# Patient Record
Sex: MALE | Race: WHITE | NOT HISPANIC OR LATINO | Employment: FULL TIME | URBAN - METROPOLITAN AREA
[De-identification: names, ages, dates, MRNs, and addresses within clinical notes are randomized per-mention and may not be internally consistent; named-entity substitution may affect disease eponyms.]

---

## 2017-03-21 ENCOUNTER — RECORDS - HEALTHEAST (OUTPATIENT)
Dept: LAB | Facility: CLINIC | Age: 52
End: 2017-03-21

## 2017-03-21 LAB
CHOLEST SERPL-MCNC: 196 MG/DL
FASTING STATUS PATIENT QL REPORTED: YES
HDLC SERPL-MCNC: 62 MG/DL
LDLC SERPL CALC-MCNC: 123 MG/DL
PSA SERPL-MCNC: 1 NG/ML (ref 0–3.5)
TRIGL SERPL-MCNC: 53 MG/DL

## 2018-09-10 ENCOUNTER — OFFICE VISIT (OUTPATIENT)
Dept: UROLOGY | Facility: CLINIC | Age: 53
End: 2018-09-10
Payer: COMMERCIAL

## 2018-09-10 VITALS
HEART RATE: 52 BPM | DIASTOLIC BLOOD PRESSURE: 84 MMHG | SYSTOLIC BLOOD PRESSURE: 142 MMHG | WEIGHT: 180 LBS | HEIGHT: 68 IN | BODY MASS INDEX: 27.28 KG/M2

## 2018-09-10 DIAGNOSIS — R39.198 SLOWING OF URINARY STREAM: Primary | ICD-10-CM

## 2018-09-10 RX ORDER — ALFUZOSIN HYDROCHLORIDE 10 MG/1
10 TABLET, EXTENDED RELEASE ORAL DAILY
Qty: 30 TABLET | Refills: 11 | Status: SHIPPED | OUTPATIENT
Start: 2018-09-10 | End: 2022-05-18

## 2018-09-10 ASSESSMENT — ENCOUNTER SYMPTOMS
STIFFNESS: 1
NECK PAIN: 1
BACK PAIN: 1
ARTHRALGIAS: 1
DIFFICULTY URINATING: 1

## 2018-09-10 ASSESSMENT — PAIN SCALES - GENERAL: PAINLEVEL: NO PAIN (0)

## 2018-09-10 NOTE — MR AVS SNAPSHOT
After Visit Summary   9/10/2018    Erik Stark    MRN: [id number]           Patient Information     Date Of Birth          1965        Visit Information        Provider Department      9/10/2018 12:00 PM Eun Boone PA-C [facility] and [facility]        Today's Diagnoses     Slowing of urinary stream    -  1      Care Instructions    UROLOGY CLINIC VISIT PATIENT INSTRUCTIONS    1) Start taking alfuzosin 10 mg once daily in the evening. This is intended to relax the prostate in order to improve your slow urinary stream/difficulty starting the urinary stream. This was sent to the pharmacy on the first floor.    2) Names of the possible prostate procedures we discussed:  -Holmium enucleation of the prostate (HoLEP)  -Transurethral resection of the prostate (TURP)  -Urolift procedure  -Various other procedures which any of our urologists would be happy to discuss with you.    Follow up in 4-6 weeks to reassess progress. We will plan to repeat another uroflow test at that visit so please come to this appointment with a comfortably full bladder.      If you have any issues, questions or concerns in the meantime, do not hesitate to contact us at [phone] or via [employer].     It was a pleasure meeting with you today.  Thank you for allowing me and my team the privilege of caring for you today.  YOU are the reason we are here, and I truly hope we provided you with the excellent service you deserve.  Please let us know if there is anything else we can do for you so that we can be sure you are leaving completely satisfied with your care experience.                Follow-ups after your visit        Who to contact     Please call your clinic at [phone] to:    Ask questions about your health    Make or cancel appointments    Discuss your medicines    Learn about your test results    Speak to your doctor            Additional Information About Your Visit       "  MyChart Information     Digify is an electronic gateway that provides easy, online access to your medical records. With Digify, you can request a clinic appointment, read your test results, renew a prescription or communicate with your care team.     To sign up for Digify visit the website at [email or web address]   You will be asked to enter the access code listed below, as well as some personal information. Please follow the directions to create your username and password.     Your access code is: [email or web address]  Expires: 2018  6:30 AM     Your access code will  in 90 days. If you need help or a new code, please contact your [facility] or call [phone] for assistance.        Care EveryWhere ID     This is your Care EveryWhere ID. This could be used by other organizations to access your [facility] medical records  [id number]        Your Vitals Were     Pulse Height BMI (Body Mass Index)             52 1.727 m (5' 8\") 27.37 kg/m2          Blood Pressure from Last 3 Encounters:   09/10/18 142/84    Weight from Last 3 Encounters:   09/10/18 81.6 kg (180 lb)              We Performed the Following     POST-VOID RESIDUAL BLADDER SCAN          Today's Medication Changes          These changes are accurate as of 9/10/18 12:40 PM.  If you have any questions, ask your nurse or doctor.               Start taking these medicines.        Dose/Directions    alfuzosin 10 MG 24 hr tablet   Commonly known as:  UROXATRAL   Used for:  Slowing of urinary stream   Started by:  Eun Boone PA-C        Dose:  10 mg   Take 1 tablet (10 mg) by mouth daily   Quantity:  30 tablet   Refills:  11            Where to get your medicines      These medications were sent to [address] [facility], [facility] [address]    Hours:  TRANSPLANT PHONE NUMBER [phone] Phone:  [phone]     " alfuzosin 10 MG 24 hr tablet                Primary Care Provider    None Specified       No primary provider on file.        Equal Access to Services     [facility] : Hadii aad ku hadangelicajose r Nicolas, wadarnellda edwinaleonardoha, nehemiahlima hernandezrehanaverito fraustoouomuverito, jennifer ochoa larissademario haynesannemarieastrid luna. [facility] [phone].    ATENCIÓN: Si habla español, tiene a ward disposición servicios gratuitos de asistencia lingüística. Llame al [phone].    We comply with applicable federal civil rights laws and [place] laws. We do not discriminate on the basis of race, color, national origin, age, disability, sex, sexual orientation, or gender identity.            Thank you!     Thank you for choosing [facility] UROLOGY AND [facility] FOR PROSTATE AND UROLOGIC CANCERS  for your care. Our goal is always to provide you with excellent care. Hearing back from our patients is one way we can continue to improve our services. Please take a few minutes to complete the written survey that you may receive in the mail after your visit with us. Thank you!             Your Updated Medication List - Protect others around you: Learn how to safely use, store and throw away your medicines at www.disposemymeds.org.          This list is accurate as of 9/10/18 12:40 PM.  Always use your most recent med list.                   Brand Name Dispense Instructions for use Diagnosis    alfuzosin 10 MG 24 hr tablet    UROXATRAL    30 tablet    Take 1 tablet (10 mg) by mouth daily    Slowing of urinary stream

## 2018-09-10 NOTE — NURSING NOTE
"Chief Complaint   Patient presents with     Consult     weak urinary pressure, \"lack of pressure\"       Tonja Felton MA  "

## 2018-09-10 NOTE — PATIENT INSTRUCTIONS
UROLOGY CLINIC VISIT PATIENT INSTRUCTIONS    1) Start taking alfuzosin 10 mg once daily in the evening. This is intended to relax the prostate in order to improve your slow urinary stream/difficulty starting the urinary stream. This was sent to the pharmacy on the first floor.    2) Names of the possible prostate procedures we discussed:  -Holmium laser enucleation of the prostate (HoLEP)  -Transurethral resection of the prostate (TURP)  -Urolift procedure  -Various other procedures which any of our urologists would be happy to discuss with you.    Follow up in 4-6 weeks to reassess progress. We will plan to repeat another uroflow test at that visit so please come to this appointment with a comfortably full bladder.      If you have any issues, questions or concerns in the meantime, do not hesitate to contact us at [phone] or via [employer].     It was a pleasure meeting with you today.  Thank you for allowing me and my team the privilege of caring for you today.  YOU are the reason we are here, and I truly hope we provided you with the excellent service you deserve.  Please let us know if there is anything else we can do for you so that we can be sure you are leaving completely satisfied with your care experience.

## 2018-09-10 NOTE — PROGRESS NOTES
"CC: weak urinary stream.    HPI: It is a pleasure to see Mr. Erik Stark, a very pleasant 53 year old male who presents via self-referral for evaluation of the above. Symptoms have been ongoing for a few years and are progressively worsening. He describes a weak urinary stream with a \"lack of pressure\" behind the stream. Overall, this is not significantly bothersome during his everyday life. However, he has had instances while scuba diving where he has been unable to void despite a strong urge to go. This often continues on the boat after the diver is completed. He feels that he has a hard time \"relaxing\" in order to urinate. Overall, he feels to empty his bladder to completion most of the time (some foods or medications can sometimes make this worse).     He voids q2 hours during the day, nocturia x 1. He denies dysuria, gross hematuria, pyuria, incontinence, or history of UTI's or kidney stones. No prior history of prostate or bladder issues. His father has bladder/prostate issues but no history of prostate cancer.     No past medical history on file.  No past surgical history on file.  No current outpatient prescriptions on file.     Not on File  FAMILY HISTORY: The patient denies history of genitourinary carcinoma.  There is no history of urolithiasis.    SOCIAL HISTORY: The patient does not smoke cigarettes, minimal EtOH and no illicit drug use.    ROS:   Answers for HPI/ROS submitted by the patient on 9/10/2018   General Symptoms: No  Skin Symptoms: No  HENT Symptoms: Yes  EYE SYMPTOMS: No  HEART SYMPTOMS: No  LUNG SYMPTOMS: No  INTESTINAL SYMPTOMS: No  URINARY SYMPTOMS: Yes  REPRODUCTIVE SYMPTOMS: No  SKELETAL SYMPTOMS: Yes  BLOOD SYMPTOMS: No  NERVOUS SYSTEM SYMPTOMS: No  MENTAL HEALTH SYMPTOMS: No  Tinnitus: Yes  Gum tenderness: Yes  Trouble starting or stopping: Yes  Difficulty emptying bladder: Yes  Back pain: Yes  Neck pain: Yes  Joint pain: Yes  Joint stiffness: Yes  PHQ-2 Score: 0    PHYSICAL EXAM: " "  Vitals:    09/10/18 1157   BP: 142/84   Pulse: 52   Weight: 81.6 kg (180 lb)   Height: 1.727 m (5' 8\")     GENERAL: Well groomed/well developed/well nourished male in NAD.  HEENT: EOMI, AT, NC.  SKIN: Warm to touch, dry.  No visible rashes or lesions.  RESP: No increased respiratory effort.  LYMPH: No LE edema.  MS: Full ROM in extremities.  : Deferred.  CHARLOTTE:     Good sphincter tone, smooth rectal mucosa.     Very mildly enlarged prostate that is smooth to palpation without nodules, mass, asymmetry, tenderness or bogginess.  NEURO: Alert and oriented x 3.  PSYCH: Normal mood and affect, pleasant and agreeable during interview and exam.    Uroflowmetry:   Peak Flow: 8.2 mL/s  Average Flow: 5 mL/s  Voided (mL): 221 mL  Residual Volume by Ultrasound: 27 mL  Character of Curve: obstructed plateau    No results found for any previous visit.  No results found for: PSA      ASSESSMENT/PLAN:  Mr. Erik Stark is a 53 year old male with weak urinary stream. We discussed possible differentials including prostatomegaly vs. other outlet obstruction, pelvic floor dysfunction, hypocontractile detrusor, etc. We further discussed potential management options including: double voiding, avoiding known bladder irritants, alpha blocker medication, 5 alpha reductase inhibitor medication, pelvic floor physical therapy, possible outlet reductive surgery, or further evaluation with urodynamics +/- cystoscopy. The patient has elected to proceed with the following:    -Trial of alfuzosin 10 mg once daily. Side effects discussed.    Follow up in 4-6 weeks for repeat uroflowmetry studies, AUA symptom score and reassessment.  Call or RTC sooner with any issues. Patient is not keen on taking a daily medication so if he finds alpha blocker therapy beneficial, will consider referral to urologist for discussion of a possible outlet procedure.      Video urodynamics and/or cystoscopic evaluation would be the next appropriate diagnostic steps " should Mr. Stark fail conservative therapy.     I have enjoyed participating in the medical care of this very pleasant patient.  Please don't hesitate to contact me with any questions or concerns.     Eun Boone PA-C  Department of Urology

## 2018-09-10 NOTE — LETTER
"9/10/2018       RE: Erik Stark  [address] [id number]  [place] [address]     Dear Colleague,    Thank you for referring your patient, Erik Stark, to the [facility] UROLOGY AND INST FOR PROSTATE AND UROLOGIC CANCERS at [facility]. Please see a copy of my visit note below.    CC: weak urinary stream.    HPI: It is a pleasure to see . Erik Stark, a very pleasant 53 year old male who presents via self-referral for evaluation of the above. Symptoms have been ongoing for a few years and are progressively worsening. He describes a weak urinary stream with a \"lack of pressure\" behind the stream. Overall, this is not significantly bothersome during his everyday life. However, he has had instances while scuba diving where he has been unable to void despite a strong urge to go. This often continues on the boat after the diver is completed. He feels that he has a hard time \"relaxing\" in order to urinate. Overall, he feels to empty his bladder to completion most of the time (some foods or medications can sometimes make this worse).     He voids q2 hours during the day, nocturia x 1. He denies dysuria, gross hematuria, pyuria, incontinence, or history of UTI's or kidney stones. No prior history of prostate or bladder issues. His father has bladder/prostate issues but no history of prostate cancer.     No past medical history on file.  No past surgical history on file.  No current outpatient prescriptions on file.     Not on File  FAMILY HISTORY: The patient denies history of genitourinary carcinoma.  There is no history of urolithiasis.    SOCIAL HISTORY: The patient does not smoke cigarettes, minimal EtOH and no illicit drug use.    ROS:   Answers for HPI/ROS submitted by the patient on 9/10/2018   General Symptoms: No  Skin Symptoms: No  HENT Symptoms: Yes  EYE SYMPTOMS: No  HEART SYMPTOMS: No  LUNG SYMPTOMS: No  INTESTINAL SYMPTOMS: No  URINARY SYMPTOMS: Yes  REPRODUCTIVE SYMPTOMS: " "No  SKELETAL SYMPTOMS: Yes  BLOOD SYMPTOMS: No  NERVOUS SYSTEM SYMPTOMS: No  MENTAL HEALTH SYMPTOMS: No  Tinnitus: Yes  Gum tenderness: Yes  Trouble starting or stopping: Yes  Difficulty emptying bladder: Yes  Back pain: Yes  Neck pain: Yes  Joint pain: Yes  Joint stiffness: Yes  PHQ-2 Score: 0    PHYSICAL EXAM:   Vitals:    09/10/18 1157   BP: 142/84   Pulse: 52   Weight: 81.6 kg (180 lb)   Height: 1.727 m (5' 8\")     GENERAL: Well groomed/well developed/well nourished male in NAD.  HEENT: EOMI, AT, NC.  SKIN: Warm to touch, dry.  No visible rashes or lesions.  RESP: No increased respiratory effort.  LYMPH: No LE edema.  MS: Full ROM in extremities.  : Deferred.  CHARLOTTE:     Good sphincter tone, smooth rectal mucosa.     Very mildly enlarged prostate that is smooth to palpation without nodules, mass, asymmetry, tenderness or bogginess.  NEURO: Alert and oriented x 3.  PSYCH: Normal mood and affect, pleasant and agreeable during interview and exam.    Uroflowmetry:   Peak Flow: 8.2 mL/s  Average Flow: 5 mL/s  Voided (mL): 221 mL  Residual Volume by Ultrasound: 27 mL  Character of Curve: obstructed plateau    No results found for any previous visit.  No results found for: PSA      ASSESSMENT/PLAN:  Mr. Erik Stark is a 53 year old male with weak urinary stream. We discussed possible differentials including prostatomegaly vs. other outlet obstruction, pelvic floor dysfunction, hypocontractile detrusor, etc. We further discussed potential management options including: double voiding, avoiding known bladder irritants, alpha blocker medication, 5 alpha reductase inhibitor medication, pelvic floor physical therapy, possible outlet reductive surgery, or further evaluation with urodynamics +/- cystoscopy. The patient has elected to proceed with the following:    -Trial of alfuzosin 10 mg once daily. Side effects discussed.    Follow up in 4-6 weeks for repeat uroflowmetry studies, AUA symptom score and reassessment.  " Call or RTC sooner with any issues. Patient is not keen on taking a daily medication so if he finds alpha blocker therapy beneficial, will consider referral to urologist for discussion of a possible outlet procedure.      Video urodynamics and/or cystoscopic evaluation would be the next appropriate diagnostic steps should Mr. Stark fail conservative therapy.     I have enjoyed participating in the medical care of this very pleasant patient.  Please don't hesitate to contact me with any questions or concerns.     Eun Boone PA-C  Department of Urology          Again, thank you for allowing me to participate in the care of your patient.      Sincerely,    Eun Boone PA-C

## 2018-09-10 NOTE — LETTER
Date:September 11, 2018      Patient was self referred, no letter generated. Do not send.        [facility] Information

## 2018-09-24 ENCOUNTER — PRE VISIT (OUTPATIENT)
Dept: UROLOGY | Facility: CLINIC | Age: 53
End: 2018-09-24

## 2018-09-24 NOTE — TELEPHONE ENCOUNTER
Patient is coming in to see Eun Boone PA-C for a flow/PVR, called patient but phone dose not work.

## 2018-10-15 ENCOUNTER — PATIENT OUTREACH (OUTPATIENT)
Dept: CARE COORDINATION | Facility: CLINIC | Age: 53
End: 2018-10-15

## 2018-10-23 ENCOUNTER — PRE VISIT (OUTPATIENT)
Dept: UROLOGY | Facility: CLINIC | Age: 53
End: 2018-10-23

## 2018-11-09 ENCOUNTER — TELEPHONE (OUTPATIENT)
Dept: UROLOGY | Facility: CLINIC | Age: 53
End: 2018-11-09

## 2018-11-09 NOTE — TELEPHONE ENCOUNTER
[facility] Call Center    Phone Message    May a detailed message be left on voicemail: yes    Reason for Call: Other: Pt is wanting to change providers, please call pt to discuss and schedule with someone else for his same issue     Action Taken: Message routed to:  [facility] Urology Clinic

## 2018-11-12 NOTE — TELEPHONE ENCOUNTER
Number patient provided is no longer in service, unable to leave a voicemail.    Zeynep Dela Cruz, RN, BSN  Care Coordinator- Reconstructive Urology

## 2018-12-13 ENCOUNTER — PRE VISIT (OUTPATIENT)
Dept: UROLOGY | Facility: CLINIC | Age: 53
End: 2018-12-13

## 2018-12-17 NOTE — PROGRESS NOTES
Chief Complaint:   BPH/LUTS         History of Present Illness:    Erik Stark is a very pleasant 53 year old male who presents with a history of BPH/LUTS. His main symptom is weak urinary stream that has been ongoing for a few years and progressively worsening. He saw Eun Boone PA-C on 9/10/18 and started on Alfuzosin. He reports slightly increased strength of his stream but still having ongoing symptoms. No prior personal history of prostate or bladder issues. Reports father has hx of BPH with bladder pathology, but no family history of  malignancy.         Past Medical History:   History reviewed. No pertinent past medical history.         Past Surgical History:   History reviewed. No pertinent surgical history.         Medications     Current Outpatient Medications   Medication     alfuzosin (UROXATRAL) 10 MG 24 hr tablet     No current facility-administered medications for this visit.             Family History:   Father: BPH with bladder pathology         Social History:     Social History     Socioeconomic History     Marital status:      Spouse name: Not on file     Number of children: Not on file     Years of education: Not on file     Highest education level: Not on file   Social Needs     Financial resource strain: Not on file     Food insecurity - worry: Not on file     Food insecurity - inability: Not on file     Transportation needs - medical: Not on file     Transportation needs - non-medical: Not on file   Occupational History     Not on file   Tobacco Use     Smoking status: Former Smoker     Smokeless tobacco: Never Used   Substance and Sexual Activity     Alcohol use: Not on file     Drug use: Not on file     Sexual activity: Not on file   Other Topics Concern     Not on file   Social History Narrative     Not on file            Allergies:   Patient has no known allergies.         Review of Systems:  From intake questionnaire   Negative 14 system review except as noted on  "HPI, nurse's note.         Physical Exam:   Patient is a 53 year old  male   Vitals: Blood pressure 126/79, pulse 59, height 1.727 m (5' 8\"), weight 81.6 kg (180 lb).  General Appearance Adult: Alert, no acute distress, oriented  Lungs: normal breathing on room air  Heart: No obvious jugular venous distension present  Skin: no suspicious lesions or rashes  Neuro: Alert, oriented, speech and mentation normal  Psych: affect and mood normal  Gait: Normal  CHARLOTTE: normal prostate, not enlarged      Uroflow and Post-Void Residual by Bladder Scan   9/10/18 Uroflowmetry:   Peak Flow: 8.2 mL/s  Average Flow: 5 mL/s  Voided (mL): 221 mL  Residual Volume by Ultrasound: 27 mL  Character of Curve: obstructed plateau      Labs and Pathology:    I personally reviewed all applicable laboratory data and went over findings with patient.     No results for this visit.    Imaging:    I personally reviewed all applicable imaging and went over findings with patient.    No results for this visit.   Standardized Questionnaire:      [employer] SYMPTOM SCORE  SUM 16/35  QOL 5/6           Assessment and Plan:     Assessment: 53 year old male with symptoms concerning for BPH. Prostate normal on CHARLOTTE.     Plan:  - PSA tumor marker   - Schedule for cystoscopy next available to help plan possible outlet procedure    Scribe Disclosure:   I,Merna Mckeon, am serving as a scribe; to document services personally performed by Sumit Felton MD based on data collection and the provider's statements to me.     ISumit saw and evaluated this patient and agree with the plan as stated above.  I personally performed all listed procedures.     CC:  No primary care provider on file.      "

## 2018-12-18 ENCOUNTER — OFFICE VISIT (OUTPATIENT)
Dept: UROLOGY | Facility: CLINIC | Age: 53
End: 2018-12-18
Payer: COMMERCIAL

## 2018-12-18 VITALS
HEIGHT: 68 IN | DIASTOLIC BLOOD PRESSURE: 79 MMHG | SYSTOLIC BLOOD PRESSURE: 126 MMHG | WEIGHT: 180 LBS | HEART RATE: 59 BPM | BODY MASS INDEX: 27.28 KG/M2

## 2018-12-18 DIAGNOSIS — R39.9 LOWER URINARY TRACT SYMPTOMS (LUTS): ICD-10-CM

## 2018-12-18 DIAGNOSIS — N21.9: Primary | ICD-10-CM

## 2018-12-18 LAB — PSA SERPL-MCNC: 1.05 UG/L (ref 0–4)

## 2018-12-18 ASSESSMENT — MIFFLIN-ST. JEOR: SCORE: 1640.97

## 2018-12-18 ASSESSMENT — PAIN SCALES - GENERAL: PAINLEVEL: NO PAIN (0)

## 2018-12-18 NOTE — PATIENT INSTRUCTIONS
Check PSA today.    Schedule appointment with Dr. Felton for cystoscopy.  Try to come to this appointment with a full bladder, if possible.    It was a pleasure meeting with you today.  Thank you for allowing me and my team the privilege of caring for you today.  YOU are the reason we are here, and I truly hope we provided you with the excellent service you deserve.  Please let us know if there is anything else we can do for you so that we can be sure you are leaving completely satisfied with your care experience.        JAMEL Escoto

## 2018-12-18 NOTE — LETTER
12/18/2018       RE: Erik Stark  [address] [address]  [place] [address]     Dear Colleague,    Thank you for referring your patient, Erik Stark, to the [facility] UROLOGY AND INST FOR PROSTATE AND UROLOGIC CANCERS at [facility]. Please see a copy of my visit note below.            Chief Complaint:   BPH/LUTS         History of Present Illness:    Erik Stark is a very pleasant 53 year old male who presents with a history of BPH/LUTS. His main symptom is weak urinary stream that has been ongoing for a few years and progressively worsening. He saw Eun Boone PA-C on 9/10/18 and started on Alfuzosin. He reports slightly increased strength of his stream but still having ongoing symptoms. No prior personal history of prostate or bladder issues. Reports father has hx of BPH with bladder pathology, but no family history of  malignancy.         Past Medical History:   History reviewed. No pertinent past medical history.         Past Surgical History:   History reviewed. No pertinent surgical history.         Medications     Current Outpatient Medications   Medication     alfuzosin (UROXATRAL) 10 MG 24 hr tablet     No current facility-administered medications for this visit.             Family History:   Father: BPH with bladder pathology         Social History:     Social History     Socioeconomic History     Marital status:      Spouse name: Not on file     Number of children: Not on file     Years of education: Not on file     Highest education level: Not on file   Social Needs     Financial resource strain: Not on file     Food insecurity - worry: Not on file     Food insecurity - inability: Not on file     Transportation needs - medical: Not on file     Transportation needs - non-medical: Not on file   Occupational History     Not on file   Tobacco Use     Smoking status: Former Smoker     Smokeless tobacco: Never Used   Substance and Sexual Activity     Alcohol use:  "Not on file     Drug use: Not on file     Sexual activity: Not on file   Other Topics Concern     Not on file   Social History Narrative     Not on file            Allergies:   Patient has no known allergies.         Physical Exam:   Patient is a 53 year old  male   Vitals: Blood pressure 126/79, pulse 59, height 1.727 m (5' 8\"), weight 81.6 kg (180 lb).  General Appearance Adult: Alert, no acute distress, oriented  Lungs: normal breathing on room air  Heart: No obvious jugular venous distension present  Skin: no suspicious lesions or rashes  Neuro: Alert, oriented, speech and mentation normal  Psych: affect and mood normal  Gait: Normal  CHARLOTTE: normal prostate, not enlarged      Uroflow and Post-Void Residual by Bladder Scan   9/10/18 Uroflowmetry:   Peak Flow: 8.2 mL/s  Average Flow: 5 mL/s  Voided (mL): 221 mL  Residual Volume by Ultrasound: 27 mL  Character of Curve: obstructed plateau      Labs and Pathology:    I personally reviewed all applicable laboratory data and went over findings with patient.     No results for this visit.    Imaging:    I personally reviewed all applicable imaging and went over findings with patient.    No results for this visit.   Standardized Questionnaire:      [employer] SYMPTOM SCORE  SUM 16/35  QOL 5/6           Assessment and Plan:     Assessment: 53 year old male with symptoms concerning for BPH. Prostate normal on CHARLOTTE.     Plan:  - PSA tumor marker   - Schedule for cystoscopy next available to help plan possible outlet procedure    Scribe Disclosure:   Merna HAHN, am serving as a scribe; to document services personally performed by Sumit Felton MD based on data collection and the provider's statements to me.     ISumit saw and evaluated this patient and agree with the plan as stated above.  I personally performed all listed procedures.     CC:  No primary care provider on file.    Again, thank you for allowing me to participate " in the care of your patient.      Sincerely,    Sumit Felton MD

## 2018-12-18 NOTE — NURSING NOTE
Chief Complaint   Patient presents with     RECHECK     Follow up- slow urinary stream.  Discuss treatment options     JAMEL Escoto

## 2018-12-28 ENCOUNTER — RECORDS - HEALTHEAST (OUTPATIENT)
Dept: LAB | Facility: CLINIC | Age: 53
End: 2018-12-28

## 2018-12-28 LAB
ALBUMIN SERPL-MCNC: 4 G/DL (ref 3.5–5)
ALP SERPL-CCNC: 66 U/L (ref 45–120)
ALT SERPL W P-5'-P-CCNC: 16 U/L (ref 0–45)
ANION GAP SERPL CALCULATED.3IONS-SCNC: 10 MMOL/L (ref 5–18)
AST SERPL W P-5'-P-CCNC: 17 U/L (ref 0–40)
BILIRUB SERPL-MCNC: 1.7 MG/DL (ref 0–1)
BUN SERPL-MCNC: 18 MG/DL (ref 8–22)
CALCIUM SERPL-MCNC: 9.5 MG/DL (ref 8.5–10.5)
CHLORIDE BLD-SCNC: 107 MMOL/L (ref 98–107)
CHOLEST SERPL-MCNC: 199 MG/DL
CO2 SERPL-SCNC: 25 MMOL/L (ref 22–31)
CREAT SERPL-MCNC: 0.81 MG/DL (ref 0.7–1.3)
FASTING STATUS PATIENT QL REPORTED: YES
GFR SERPL CREATININE-BSD FRML MDRD: >60 ML/MIN/1.73M2
GLUCOSE BLD-MCNC: 105 MG/DL (ref 70–125)
HDLC SERPL-MCNC: 61 MG/DL
LDLC SERPL CALC-MCNC: 126 MG/DL
POTASSIUM BLD-SCNC: 5.1 MMOL/L (ref 3.5–5)
PROT SERPL-MCNC: 7.1 G/DL (ref 6–8)
SODIUM SERPL-SCNC: 142 MMOL/L (ref 136–145)
TRIGL SERPL-MCNC: 62 MG/DL
TSH SERPL DL<=0.005 MIU/L-ACNC: 0.02 UIU/ML (ref 0.3–5)

## 2019-01-02 ENCOUNTER — ANCILLARY PROCEDURE (OUTPATIENT)
Dept: GENERAL RADIOLOGY | Facility: CLINIC | Age: 54
End: 2019-01-02
Attending: PODIATRIST
Payer: COMMERCIAL

## 2019-01-02 ENCOUNTER — ANCILLARY PROCEDURE (OUTPATIENT)
Dept: CT IMAGING | Facility: CLINIC | Age: 54
End: 2019-01-02
Attending: PODIATRIST
Payer: COMMERCIAL

## 2019-01-02 ENCOUNTER — OFFICE VISIT (OUTPATIENT)
Dept: PODIATRY | Facility: CLINIC | Age: 54
End: 2019-01-02
Payer: COMMERCIAL

## 2019-01-02 VITALS
SYSTOLIC BLOOD PRESSURE: 118 MMHG | BODY MASS INDEX: 27.28 KG/M2 | HEART RATE: 57 BPM | DIASTOLIC BLOOD PRESSURE: 77 MMHG | WEIGHT: 180 LBS | HEIGHT: 68 IN

## 2019-01-02 DIAGNOSIS — M79.671 PAIN OF RIGHT HEEL: ICD-10-CM

## 2019-01-02 DIAGNOSIS — M72.2 PLANTAR FASCIITIS OF RIGHT FOOT: ICD-10-CM

## 2019-01-02 DIAGNOSIS — M84.374A STRESS FRACTURE OF CALCANEUS, RIGHT, INITIAL ENCOUNTER: ICD-10-CM

## 2019-01-02 DIAGNOSIS — M72.2 PLANTAR FASCIITIS OF RIGHT FOOT: Primary | ICD-10-CM

## 2019-01-02 LAB — RADIOLOGIST FLAGS: ABNORMAL

## 2019-01-02 PROCEDURE — 73700 CT LOWER EXTREMITY W/O DYE: CPT | Mod: RT | Performed by: RADIOLOGY

## 2019-01-02 PROCEDURE — 73650 X-RAY EXAM OF HEEL: CPT | Mod: RT | Performed by: RADIOLOGY

## 2019-01-02 PROCEDURE — 99202 OFFICE O/P NEW SF 15 MIN: CPT | Performed by: PODIATRIST

## 2019-01-02 ASSESSMENT — MIFFLIN-ST. JEOR: SCORE: 1640.97

## 2019-01-02 ASSESSMENT — PAIN SCALES - GENERAL: PAINLEVEL: NO PAIN (1)

## 2019-01-02 NOTE — NURSING NOTE
"Erik Stark's chief complaint for this visit includes:  Chief Complaint   Patient presents with     Foot Pain     Plantar fasciitis both feet.      PCP: Madhu Talbot    Referring Provider:  Referred Self, MD  No address on file    /77   Pulse 57   Ht 1.727 m (5' 8\")   Wt 81.6 kg (180 lb)   BMI 27.37 kg/m    No Pain (1)     Do you need any medication refills at today's visit? No    "

## 2019-01-02 NOTE — LETTER
1/2/2019         RE: Erik Stark  [address] [address]  [place] [address]        Dear Colleague,    Thank you for referring your patient, Erik Stark, to the [facility]. Please see a copy of my visit note below.    Date of Service: 1/2/2019    Chief Complaint   Patient presents with     Foot Pain     Plantar fasciitis both feet.           HPI: Erik is a 53 year old male who presents today for further evaluation of right plantar fasciitis.  Nature: sharp/dull/aching    Location: right plantar heel    Duration: 6 months    Onset: gradual. No inciting trauma. 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     Course: continues to have pain.    Aggravating/alleviating factors: +Post-static dyskinesia. Walking and standing aggravate. Rest alleviates.     Previous Treatments: ASA, Superfeet orthotics. These have helped. Compression socks have also helped.       Review of Systems: No n/v/d/f/c/ns/sob/cp    PMH: No past medical history on file.    PSxH: No past surgical history on file.    Allergies: Patient has no known allergies.    SH:   Social History     Socioeconomic History     Marital status:      Spouse name: Not on file     Number of children: Not on file     Years of education: Not on file     Highest education level: Not on file   Social Needs     Financial resource strain: Not on file     Food insecurity - worry: Not on file     Food insecurity - inability: Not on file     Transportation needs - medical: Not on file     Transportation needs - non-medical: Not on file   Occupational History     Not on file   Tobacco Use     Smoking status: Former Smoker     Smokeless tobacco: Never Used   Substance and Sexual Activity     Alcohol use: Not on file     Drug use: Not on file     Sexual activity: Not on file   Other Topics Concern     Not on file   Social History Narrative     Not on file       FH: No family history on file.    Objective:  Data Unavailable Data  Unavailable Data Unavailable Data Unavailable Data Unavailable 0 lbs 0 oz    PT and DP pulses are 2/4 bilaterally. CRT is 3 seconds. Positive pedal hair.   Gross sensation is intact bilaterally.   Equinus is moderate bilaterally. No pain with active or passive ROM of the ankle, MTJ, 1st ray, or halluces bilaterally,. Pain noted with palpation of the central attachment of the plantar fascia on the right foot. Minimal pain on the left foot in the same area. No pain along the courses of the PT, peroneal, Achilles, or medial band of the PF.  Nails normal bilaterally. No open lesions are noted.     right calcaneal xrays indicated in 2 weightbearing views.    Minimal spurring to the right calcaneus. There is a liner lucency noted on both views in the medial calcaneus. Concerned for possible stress fx of calcaneus.       Assessment: Right foot heel pain. Plantar fasciitis vs calcaneal stress fx.      Plan:  - Pt seen and evaluated.  - Xrays taken and discussed with him. I do see a linear lucency in the calcaneus. Will order a CT for better assessment. If fractured, will call back and he will obtain a boot. If not, will treat as plantar fasciitis.   - CT for now.  - See again next week at the [facility].              Again, thank you for allowing me to participate in the care of your patient.        Sincerely,        Emery Shipley DPM

## 2019-01-02 NOTE — PROGRESS NOTES
Date of Service: 1/2/2019    Chief Complaint   Patient presents with     Foot Pain     Plantar fasciitis both feet.           HPI: Erik is a 53 year old male who presents today for further evaluation of right plantar fasciitis.  Nature: sharp/dull/aching    Location: right plantar heel    Duration: 6 months    Onset: gradual. No inciting trauma. 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     Course: continues to have pain.    Aggravating/alleviating factors: +Post-static dyskinesia. Walking and standing aggravate. Rest alleviates.     Previous Treatments: ASA, Superfeet orthotics. These have helped. Compression socks have also helped.       Review of Systems: No n/v/d/f/c/ns/sob/cp    PMH: No past medical history on file.    PSxH: No past surgical history on file.    Allergies: Patient has no known allergies.    SH:   Social History     Socioeconomic History     Marital status:      Spouse name: Not on file     Number of children: Not on file     Years of education: Not on file     Highest education level: Not on file   Social Needs     Financial resource strain: Not on file     Food insecurity - worry: Not on file     Food insecurity - inability: Not on file     Transportation needs - medical: Not on file     Transportation needs - non-medical: Not on file   Occupational History     Not on file   Tobacco Use     Smoking status: Former Smoker     Smokeless tobacco: Never Used   Substance and Sexual Activity     Alcohol use: Not on file     Drug use: Not on file     Sexual activity: Not on file   Other Topics Concern     Not on file   Social History Narrative     Not on file       FH: No family history on file.    Objective:  Data Unavailable Data Unavailable Data Unavailable Data Unavailable Data Unavailable 0 lbs 0 oz    PT and DP pulses are 2/4 bilaterally. CRT is 3 seconds. Positive pedal hair.   Gross sensation is intact bilaterally.   Equinus is moderate bilaterally. No  pain with active or passive ROM of the ankle, MTJ, 1st ray, or halluces bilaterally,. Pain noted with palpation of the central attachment of the plantar fascia on the right foot. Minimal pain on the left foot in the same area. No pain along the courses of the PT, peroneal, Achilles, or medial band of the PF.  Nails normal bilaterally. No open lesions are noted.     right calcaneal xrays indicated in 2 weightbearing views.    Minimal spurring to the right calcaneus. There is a liner lucency noted on both views in the medial calcaneus. Concerned for possible stress fx of calcaneus.       Assessment: Right foot heel pain. Plantar fasciitis vs calcaneal stress fx.      Plan:  - Pt seen and evaluated.  - Xrays taken and discussed with him. I do see a linear lucency in the calcaneus. Will order a CT for better assessment. If fractured, will call back and he will obtain a boot. If not, will treat as plantar fasciitis.   - CT for now.  - See again next week at the [facility].

## 2019-01-02 NOTE — PATIENT INSTRUCTIONS
Thanks for coming today.  Ortho/Sports Medicine Clinic  [address] [place] [address]    To schedule future appointments in Ortho Clinic, you may call [phone].    To schedule ordered imaging by your provider:   Call Central Imaging [phone]    To schedule an injection ordered by your provider:  Call Central Imaging Injection scheduling line: [phone]  [employer] available online at:  [email or web address]    Please call if any further questions or concerns ([phone]).  Clinic hours 8 am to 5 pm.    Return to clinic (call) if symptoms worsen or fail to improve.

## 2019-01-03 LAB
SHBG SERPL-SCNC: 29 NMOL/L (ref 11–80)
TESTOST FREE SERPL-MCNC: 8.77 NG/DL (ref 4.7–24.4)
TESTOST SERPL-MCNC: 401 NG/DL (ref 240–950)

## 2019-01-15 ENCOUNTER — PRE VISIT (OUTPATIENT)
Dept: UROLOGY | Facility: CLINIC | Age: 54
End: 2019-01-15

## 2019-01-22 ENCOUNTER — HOSPITAL ENCOUNTER (OUTPATIENT)
Facility: AMBULATORY SURGERY CENTER | Age: 54
End: 2019-01-22
Attending: UROLOGY
Payer: COMMERCIAL

## 2019-01-22 ENCOUNTER — ALLIED HEALTH/NURSE VISIT (OUTPATIENT)
Dept: UROLOGY | Facility: CLINIC | Age: 54
End: 2019-01-22
Payer: COMMERCIAL

## 2019-01-22 ENCOUNTER — OFFICE VISIT (OUTPATIENT)
Dept: UROLOGY | Facility: CLINIC | Age: 54
End: 2019-01-22
Payer: COMMERCIAL

## 2019-01-22 VITALS
BODY MASS INDEX: 27.28 KG/M2 | HEIGHT: 68 IN | SYSTOLIC BLOOD PRESSURE: 121 MMHG | WEIGHT: 180 LBS | DIASTOLIC BLOOD PRESSURE: 72 MMHG | HEART RATE: 59 BPM

## 2019-01-22 DIAGNOSIS — R39.9 LOWER URINARY TRACT SYMPTOMS (LUTS): Primary | ICD-10-CM

## 2019-01-22 LAB
ALBUMIN UR-MCNC: NEGATIVE MG/DL
APPEARANCE UR: CLEAR
BILIRUB UR QL STRIP: NEGATIVE
COLOR UR AUTO: YELLOW
GLUCOSE UR STRIP-MCNC: NEGATIVE MG/DL
HGB UR QL STRIP: NEGATIVE
KETONES UR STRIP-MCNC: NEGATIVE MG/DL
LEUKOCYTE ESTERASE UR QL STRIP: NEGATIVE
NITRATE UR QL: NEGATIVE
PH UR STRIP: 5 PH (ref 5–7)
RBC #/AREA URNS AUTO: 0 /HPF (ref 0–2)
SOURCE: NORMAL
SP GR UR STRIP: 1.02 (ref 1–1.03)
UROBILINOGEN UR STRIP-MCNC: 0 MG/DL (ref 0–2)
WBC #/AREA URNS AUTO: 0 /HPF (ref 0–5)

## 2019-01-22 ASSESSMENT — PAIN SCALES - GENERAL: PAINLEVEL: NO PAIN (0)

## 2019-01-22 ASSESSMENT — MIFFLIN-ST. JEOR: SCORE: 1640.97

## 2019-01-22 NOTE — PROCEDURES
CYSTOSCOPY PROCEDURE NOTE    Reason for cystoscopy: Lower urinary tract symptoms    Brief History: 54 yo M with hx of obstructive LUTS, particularly with scuba diving and in water    CYSTOSCOPY  After obtaining informed consent, the patient was prepped and draped in the standard sterile fashion.  The 15 Serbian flexible cystoscope was inserted through the urethral meatus.      The anterior urethra was:  normal without stricture.    The external sphincter was  appropriately coapted.   The prostatic urethra demonstrated minimal hypertrophy.    The bladder neck was  nonocclusive.    The bladder was  unremarkable for tumors, erythema or stones.    The ureteral orifices  were identified on each side in orthotopic position with efflux of clear urine.   There were no trabeculations.    On retroflexion there was the usual bladder neck hyperemia.    There was minimal circumferential intravesical protrusion of the prostate.      The patient tolerated the procedure well without complication.        Urinary Flow Rate   Peak Flow 7.9 mL/s   Average Flow 4.7 mL/s   Voided (mL) 533 mL   Residual Volume by Ultrasound 0 mL       Assessment/Plan:  Obstructive LUTS in the absence of obvious OSBORNE/BPH.  HAve requested urodynamics to confirm high pressure, low flow voiding.  If present will proceed with minimally invasive BPH intervention (discussed ALIREZA, Lorelei, Juan). Reviewed risks, benefits, alternatives.      ISumit saw and evaluated this patient and agree with the plan as stated above.  I personally performed all listed procedures.

## 2019-01-22 NOTE — PATIENT INSTRUCTIONS
Schedule surgery with Dr. Felton.    It was a pleasure meeting with you today.  Thank you for allowing me and my team the privilege of caring for you today.  YOU are the reason we are here, and I truly hope we provided you with the excellent service you deserve.  Please let us know if there is anything else we can do for you so that we can be sure you are leaving completely satisfied with your care experience.        JAMEL Escoto

## 2019-01-22 NOTE — NURSING NOTE
Pre Op Teaching Flowsheet       Pre and Post op Patient Education  Relevant Diagnosis:  LUTS      Motivation Level:  Asks Questions: Yes  Eager to Learn:  Yes  Cooperative: Yes  Receptive (willing/able to accept information):  Yes  Patient demonstrates understanding of the following:  Date and time of surgery:  Feb 21st  Location of surgery: [facility]- 5th Floor  History and Physical and any other testing necessary prior to surgery: Yes, having PAC on Feb 7th at 7:30am  Required time line for completion of History and Physical and any pre-op testing: Yes    NPO Guidelines: Nothing to eat 8 hours prior to surgery. Can have clear liquids up to 2 hours prior to sugery    Patient demonstrates understanding of the following:  Pre-op bowel prep: N/A  Pre-op showering/scrub information with Hibiclens Soap: Yes  Medications to take the day of surgery:  Per PCP  Blood thinner medications discussed and when to stop (if applicable):  Yes  Diabetes medication management (if applicable):  N/A  Discussed pain control after surgery: pain scale, pain medications and pain management techniques  Infection Prevention: Patient demonstrates understanding of the following:  Patient instructed on hand hygiene:  Yes  Surgical procedure site care taught: N/A  Signs and symptoms of infection taught:  Yes  Wound care will be taught at the time of discharge.  Central venous catheter care will be taught at the time of discharge (if applicable).    Post-op follow-up:  Discussed how to contact the hospital, nurse, and clinic scheduling staff if necessary.    Instructional materials used/given/mailed:  [facility] Booklet, post op teaching sheet, Map, Soap, and arrival/location information.    Surgical instructions given to patient in clinic: Yes.    Instructional Materials given:  Before your surgery packet , Medications to avoid before surgery , Showering or Bathing instructions before surgery  and What  to expect after surgery  Total time with patient: 10 minutes    Estrella Norwood RN

## 2019-01-22 NOTE — NURSING NOTE
Chief Complaint   Patient presents with     RECHECK     LUTS follow up with cystoscopy     JAMEL Escoto

## 2019-01-22 NOTE — PROGRESS NOTES
Invasive Procedure Safety Checklist:    Procedure: Cystoscopy    Action: Complete sections and checkboxes as appropriate.    Pre-procedure:  1. Patient ID Verified with 2 identifiers (Gloria and  or MRN) : YES    2. Procedure and site verified with patient/designee (when able) : YES    3. Accurate consent documentation in medical record : YES    4. H&P (or appropriate assessment) documented in medical record : NO  H&P must be up to 30 days prior to procedure an updated within 24 hours of                 Procedure as applicable.     5. Relevant diagnostic and radiology test results appropriately labeled and displayed as applicable : NO    6. Blood products, implants, devices, and/or special equipment available for the procedure as applicable : NO    7. Procedure site(s) marked with provider initials [Exclusions: ] : NO    8. Marking not required. Reason : Yes  Procedure does not require site marking    Time Out:     Time-Out performed immediately prior to starting procedure, including verbal and active participation of all team members addressing: YES    1. Correct patient identity.  2. Confirmed that the correct side and site are marked.  3. An accurate procedure to be done.  4. Agreement on the procedure to be done.  5. Correct patient position.  6. Relevant images and results are properly labeled and appropriately displayed.  7. The need to administer antibiotics or fluids for irrigation purposes during the procedure as applicable.  8. Safety precautions based on patient history or medication use.    During Procedure: Verification of correct person, site, and procedure occurs any time the responsibility for care of the patient is transferred to another member of the care team.      JAMEL Escoto

## 2019-01-30 NOTE — PROGRESS NOTES
CYSTOSCOPY PROCEDURE NOTE    Reason for cystoscopy: Lower urinary tract symptoms    Brief History: 54 yo M with hx of obstructive LUTS, particularly with scuba diving and in water    CYSTOSCOPY  After obtaining informed consent, the patient was prepped and draped in the standard sterile fashion.  The 15 [place] flexible cystoscope was inserted through the urethral meatus.      The anterior urethra was:  normal without stricture.    The external sphincter was  appropriately coapted.   The prostatic urethra demonstrated minimal hypertrophy.    The bladder neck was  nonocclusive.    The bladder was  unremarkable for tumors, erythema or stones.    The ureteral orifices  were identified on each side in orthotopic position with efflux of clear urine.   There were no trabeculations.    On retroflexion there was the usual bladder neck hyperemia.    There was minimal circumferential intravesical protrusion of the prostate.      The patient tolerated the procedure well without complication.        Urinary Flow Rate   Peak Flow 7.9 mL/s   Average Flow 4.7 mL/s   Voided (mL) 533 mL   Residual Volume by Ultrasound 0 mL       Assessment/Plan:  Obstructive LUTS in the absence of obvious OSBORNE/BPH.  HAve requested urodynamics to confirm high pressure, low flow voiding.  If present will proceed with minimally invasive BPH intervention (discussed ALIREZA, Lorelei, uJan). Reviewed risks, benefits, alternatives.      ISumit saw and evaluated this patient and agree with the plan as stated above.  I personally performed all listed procedures.

## 2019-02-07 ENCOUNTER — OFFICE VISIT (OUTPATIENT)
Dept: SURGERY | Facility: CLINIC | Age: 54
End: 2019-02-07
Payer: COMMERCIAL

## 2019-02-07 ENCOUNTER — ANESTHESIA EVENT (OUTPATIENT)
Dept: SURGERY | Facility: AMBULATORY SURGERY CENTER | Age: 54
End: 2019-02-07

## 2019-02-07 ENCOUNTER — PRE VISIT (OUTPATIENT)
Dept: UROLOGY | Facility: CLINIC | Age: 54
End: 2019-02-07

## 2019-02-07 VITALS
DIASTOLIC BLOOD PRESSURE: 75 MMHG | HEART RATE: 55 BPM | SYSTOLIC BLOOD PRESSURE: 124 MMHG | TEMPERATURE: 97.8 F | OXYGEN SATURATION: 97 % | WEIGHT: 187.2 LBS | HEIGHT: 68 IN | BODY MASS INDEX: 28.37 KG/M2 | RESPIRATION RATE: 16 BRPM

## 2019-02-07 DIAGNOSIS — N40.1 BENIGN PROSTATIC HYPERPLASIA WITH LOWER URINARY TRACT SYMPTOMS, SYMPTOM DETAILS UNSPECIFIED: ICD-10-CM

## 2019-02-07 DIAGNOSIS — Z01.818 PRE-OPERATIVE GENERAL PHYSICAL EXAMINATION: ICD-10-CM

## 2019-02-07 DIAGNOSIS — N40.1 BENIGN PROSTATIC HYPERPLASIA WITH LOWER URINARY TRACT SYMPTOMS, SYMPTOM DETAILS UNSPECIFIED: Primary | ICD-10-CM

## 2019-02-07 LAB
ALBUMIN UR-MCNC: NEGATIVE MG/DL
ANION GAP SERPL CALCULATED.3IONS-SCNC: 6 MMOL/L (ref 3–14)
APPEARANCE UR: CLEAR
BILIRUB UR QL STRIP: NEGATIVE
BUN SERPL-MCNC: 16 MG/DL (ref 7–30)
CALCIUM SERPL-MCNC: 8.6 MG/DL (ref 8.5–10.1)
CHLORIDE SERPL-SCNC: 103 MMOL/L (ref 94–109)
CO2 SERPL-SCNC: 28 MMOL/L (ref 20–32)
COLOR UR AUTO: YELLOW
CREAT SERPL-MCNC: 0.8 MG/DL (ref 0.66–1.25)
ERYTHROCYTE [DISTWIDTH] IN BLOOD BY AUTOMATED COUNT: 11.9 % (ref 10–15)
GFR SERPL CREATININE-BSD FRML MDRD: >90 ML/MIN/{1.73_M2}
GLUCOSE SERPL-MCNC: 98 MG/DL (ref 70–99)
GLUCOSE UR STRIP-MCNC: NEGATIVE MG/DL
HCT VFR BLD AUTO: 45.2 % (ref 40–53)
HGB BLD-MCNC: 14.8 G/DL (ref 13.3–17.7)
HGB UR QL STRIP: NEGATIVE
KETONES UR STRIP-MCNC: NEGATIVE MG/DL
LEUKOCYTE ESTERASE UR QL STRIP: NEGATIVE
MCH RBC QN AUTO: 26.7 PG (ref 26.5–33)
MCHC RBC AUTO-ENTMCNC: 32.7 G/DL (ref 31.5–36.5)
MCV RBC AUTO: 82 FL (ref 78–100)
NITRATE UR QL: NEGATIVE
PH UR STRIP: 6 PH (ref 5–7)
PLATELET # BLD AUTO: 196 10E9/L (ref 150–450)
POTASSIUM SERPL-SCNC: 4.4 MMOL/L (ref 3.4–5.3)
RBC # BLD AUTO: 5.54 10E12/L (ref 4.4–5.9)
SODIUM SERPL-SCNC: 136 MMOL/L (ref 133–144)
SOURCE: NORMAL
SP GR UR STRIP: 1.01 (ref 1–1.03)
UROBILINOGEN UR STRIP-MCNC: 0 MG/DL (ref 0–2)
WBC # BLD AUTO: 4.3 10E9/L (ref 4–11)

## 2019-02-07 RX ORDER — MULTIVIT-MIN/IRON FUM/FOLIC AC 7.5 MG-4
1 TABLET ORAL AT BEDTIME
COMMUNITY
Start: 2019-01-29 | End: 2022-05-18

## 2019-02-07 RX ORDER — CHOLECALCIFEROL (VITAMIN D3) 50 MCG
2000 TABLET ORAL AT BEDTIME
COMMUNITY
Start: 2019-01-29 | End: 2022-05-18

## 2019-02-07 RX ORDER — FLUTICASONE PROPIONATE 50 MCG
2 SPRAY, SUSPENSION (ML) NASAL DAILY
COMMUNITY
End: 2022-05-18

## 2019-02-07 RX ORDER — LORATADINE 10 MG/1
10 TABLET ORAL PRN
COMMUNITY

## 2019-02-07 ASSESSMENT — LIFESTYLE VARIABLES: TOBACCO_USE: 1

## 2019-02-07 ASSESSMENT — MIFFLIN-ST. JEOR: SCORE: 1673.63

## 2019-02-07 NOTE — PATIENT INSTRUCTIONS
Preparing for Your Surgery      Name:  Erik Stark   MRN:  [id number]   :  1965   Today's Date:  2019     Arriving for surgery:  Surgery date:  19  Arrival time:  6:25AM  Please come to:     [facility]  [address] [place] [id number]     Parking is available in front of the Clinics and Surgery Center building from 5:30AM to 8:00PM.  -  Proceed to the 5th floor to check into the Ambulatory Surgery Center.              >> There will be patient concierges on the 1st and 5th floor, for assistance or an escort, if you would like.              >> Please call [phone] with any questions.    What can I eat or drink?  -  You may have solid food or milk products until 8 hours prior to your surgery. Midnight  -  You may have water, apple juice or 7up/Sprite until 2 hours prior to your surgery. 19, 5:55AM    Which medicines can I take?  Stop Aspirin, vitamins and supplements one week prior to surgery.  Hold Ibuprofen for 24 hours and/or Naproxen for 48 hours prior to surgery.   -  Do NOT take medications in the morning, the day of surgery:    How do I prepare myself?  -  Take two showers: one the night before surgery; and one the morning of surgery.         Use Scrubcare or Hibiclens to wash from neck down.  You may use your own shampoo and conditioner. No other hair products.   -  Do NOT use lotion, powder, deodorant, or antiperspirant the day of your surgery.  -  Do NOT wear jewelry.  - Do not bring your own medications to the hospital, except for inhalers and eye drops.  -  Bring your ID and insurance card.    Questions or Concerns:  -If you are scheduled at the Ambulatory Surgery Center please call [phone].    -For questions after surgery please call your surgeons office.

## 2019-02-07 NOTE — TELEPHONE ENCOUNTER
UDS for bothersome LUTS.  Negative UA/UC on 2-7-19.  Records available in [facility].

## 2019-02-07 NOTE — H&P
Pre-Operative H & P     CC:  Preoperative exam to assess for increased cardiopulmonary risk while undergoing surgery and anesthesia.    Date of Encounter: 2/7/2019  Primary Care Physician:  Madhu Talbot  Erik Stark is a 53 year old male who presents for pre-operative H & P in preparation for transurethral incision of the prostate (laser) with Dr. Sumit Felton, on 2/21/19, for diagnosis and treatment of BPH with obstructive lower urinary tract sx.  This will be done at [facility].     History is obtained from the patient.     Past Medical History  Recurrent episodes of malaria contracted in [facility]  BPH    Past Surgical History  Past Surgical History:   Procedure Laterality Date     AS KNEE SCOPE,MED/LAT MENISCUS REPAIR Left      inquinal hernia       TONSILLECTOMY      age 4-5       Hx of Blood transfusions/reactions: no     Hx of abnormal bleeding or anti-platelet use: no    Menstrual history: No LMP for male patient.    Steroid use in the last year: no    Personal or FH with difficulty with Anesthesia:  no    Prior to Admission Medications  Current Outpatient Medications   Medication Sig Dispense Refill     alfuzosin (UROXATRAL) 10 MG 24 hr tablet Take 1 tablet (10 mg) by mouth daily (Patient taking differently: Take 10 mg by mouth At Bedtime ) 30 tablet 11     fluticasone (FLONASE) 50 MCG/ACT nasal spray 2 sprays daily       loratadine (CLARITIN) 10 MG tablet Take 10 mg by mouth as needed       multivitamins w/minerals tablet Take 1 tablet by mouth At Bedtime       vitamin D3 (CHOLECALCIFEROL) 2000 units tablet Take 2,000 Units by mouth At Bedtime         Allergies  No Known Allergies    Social History  Social History     Socioeconomic History     Marital status:      Highest education level: College education   Social Needs   Lives with his daughters in a house   Occupational History     Not on file   Tobacco Use     Smoking status: Former Smoker x 12 years age  "12-25     Smokeless tobacco: Never Used   ETOH 4 oz vodka 2-3 x a week      Family Hx:    3 sisters : One has graves disease; the other 2 with hypothyroidism   Father has ALS       Anesthesia Evaluation  Pt has had prior anesthetic.      ROS/MED HX    ENT/Pulmonary:     (+)tobacco use, Past use smoked for 12 years up to 1 PPD - quit age 25   Neurologic:  - neg neurologic ROS     Cardiovascular: Comment: Palpitations and a sensitivity to caffeine   2012 holter monitor        METS/Exercise Tolerance:  >4 METS   Hematologic:  - neg hematologic  ROS       Musculoskeletal:  - neg musculoskeletal ROS (+) , , other musculoskeletal (left heel pain / wears air cast intermittently)-       GI/Hepatic:  - neg GI/hepatic ROS       Renal/Genitourinary:  - ROS Renal section negative       Endo:     (+) thyroid problem Other Endocrine Disorder thyroid nodules with sx: biopsy scheduled for today.      Psychiatric:  - neg psychiatric ROS       Infectious Disease: Comment: Malaria: recurrent 6-7 episodes: never hospitalized  Treated with each episode    Lived in [facility] [id number]         Malignancy:      - no malignancy   Other:    (+) No chance of pregnancy H/O Chronic Pain,no other significant disability            The complete review of systems is negative other than noted in the HPI or here.   Temp: 97.8  F (36.6  C) Temp src: Oral BP: 124/75 Pulse: 55   Resp: 16 SpO2: 97 %         187 lbs 3.2 oz  5' 8\"   Body mass index is 28.46 kg/m .       Physical Exam  Constitutional: Awake, alert, cooperative, no apparent distress, and appears stated age.  Eyes: Pupils equal, round and reactive to light, extra ocular muscles intact, sclera clear, conjunctiva normal.  HENT: Normocephalic, oral pharynx with moist mucus membranes, good dentition. No goiter appreciated.   Respiratory: Clear to auscultation bilaterally, no crackles or wheezing.  Cardiovascular: Regular rate and rhythm, normal S1 and S2, and no murmur noted.  Carotids +2, no " bruits. No LE edema. Palpable pulses to radial and PT arteries.   GI: Normal bowel sounds, soft, non-distended, non-tender, no masses palpated, no hepatosplenomegaly.    Lymph/Hematologic: No cervical lymphadenopathy and no supraclavicular lymphadenopathy.  Genitourinary:  deferred  Skin: Warm and dry.    Musculoskeletal: Full ROM of neck. There is no redness, warmth, or swelling of the joints. Gross motor strength is normal.    Neurologic: Awake, alert, oriented to name, place and time. Cranial nerves II-XII are grossly intact. Gait is normal.   Neuropsychiatric: Calm, cooperative. Normal affect.     Labs: (personally reviewed)  EKG:not indicated  Outside records reviewed from: [facility] notes    ASSESSMENT and PLAN  Erik Stark is a 53 year old male scheduled to undergo transurethral incision of the prostate (laser) with Dr. Sumit Felton, on 2/21/19, at [facility], for diagnosis and treatment of BPH with obstructive sx.     Pre-operative considerations include:  1.) CV: Functional status independent. Exercise tolerance > 4 METS. Past tobacco smoker x 12 years quit age 25. No other identifiable cardiac risks.   RCRI = 0 reflecting 0.4% risk of major adverse cardiac event  No further cardiac evaluation indicated    2.) Pulmonary: smoking history as above. Wheezing reaction to environmental inhalants in past / no recent exposures and no use of inhalers.  MONROE risk is 2/8 = low risk    3.) Heme: Malaria infection with hx of 5-6 episodes of recurrence, easily treated with medication with no hospitalizations or known liver ds.  No blood transfusions. VTE risk is low. CBC today    4.) Renal/ Urology: BPH on alfuzosin.   Procedure as detailed. Urinalysis today, BMP    5.) Endo: Subclinical hyperthyroidism followed at [facility] endocrine clinic and scheduled for thyroid biopsy of nodule later today. TSH low, T3 and T4 normal..      Patient was discussed with Dr Read.   Patient is optimized and  is acceptable candidate for the proposed procedure.  No further diagnostic evaluation is needed.      WIL Cornelius  Preoperative Assessment Center  [facility]  Phone: [phone]  Fax: [phone]

## 2019-02-07 NOTE — ANESTHESIA PREPROCEDURE EVALUATION
Anesthesia Pre-Procedure Evaluation    Patient: Erik Stark   MRN:     [id number] Gender:   male   Age:    53 year old :      1965        Preoperative Diagnosis: Lower Urinary Tract Symptoms   Procedure(s):  Transurethral Incision of the Prostate Using Holmium Laser     No past medical history on file.   No past surgical history on file.       Anesthesia Evaluation     . Pt has had prior anesthetic.            ROS/MED HX    ENT/Pulmonary:     (+)tobacco use, Past use smoked for 12 years up to 1 PPD packs/day  , . .    Neurologic:  - neg neurologic ROS     Cardiovascular: Comment: Palpitations and a sensitivity to caffeine    holter monitor        METS/Exercise Tolerance:  >4 METS   Hematologic:  - neg hematologic  ROS       Musculoskeletal:  - neg musculoskeletal ROS (+) , , other musculoskeletal (left heel pain / wears air cast intermittently)-       GI/Hepatic:  - neg GI/hepatic ROS       Renal/Genitourinary:  - ROS Renal section negative       Endo:     (+) thyroid problem Other Endocrine Disorder thyroid nodules with sx: biopsy scheduled for today.      Psychiatric:  - neg psychiatric ROS       Infectious Disease: Comment: Malaria: recurrent 6-7 episodes: never hospitalized  Treated with each episode    Lived in [facility] [id number]         Malignancy:      - no malignancy   Other:    (+) No chance of pregnancy H/O Chronic Pain,no other significant disability                        PHYSICAL EXAM:   Mental Status/Neuro: A/A/O   Airway: Facies: Feasible  Mallampati: I  Mouth/Opening: Full  TM distance: > 6 cm  Neck ROM: Full   Respiratory:   Resp. Rate: Normal     Resp. Effort: Normal      CV: Rhythm: Regular  Heart: Normal Sounds   Comments:                    No results found for: WBC, HGB, HCT, PLT, CRP, SED, NA, POTASSIUM, CHLORIDE, CO2, BUN, CR, GLC, ENRIQUETA, PHOS, MAG, ALBUMIN, PROTTOTAL, ALT, AST, GGT, ALKPHOS, BILITOTAL, BILIDIRECT, LIPASE, AMYLASE, JG, PTT, INR, FIBR, TSH, T4, T3, HCG, HCGS,  "CKTOTAL, CKMB, TROPN    Preop Vitals  BP Readings from Last 3 Encounters:   01/22/19 121/72   01/02/19 118/77   12/18/18 126/79    Pulse Readings from Last 3 Encounters:   01/22/19 59   01/02/19 57   12/18/18 59      Resp Readings from Last 3 Encounters:   No data found for Resp    SpO2 Readings from Last 3 Encounters:   No data found for SpO2      Temp Readings from Last 1 Encounters:   No data found for Temp    Ht Readings from Last 1 Encounters:   01/22/19 1.727 m (5' 8\")      Wt Readings from Last 1 Encounters:   01/22/19 81.6 kg (180 lb)    Estimated body mass index is 27.37 kg/m  as calculated from the following:    Height as of 1/22/19: 1.727 m (5' 8\").    Weight as of 1/22/19: 81.6 kg (180 lb).     LDA:            JCELSAG FV AN PLAN NO PONV RULE         PAC Discussion and Assessment    ASA Classification: 1  Case is suitable for:   Anesthetic techniques and relevant risks discussed: GA  Invasive monitoring and risk discussed: No  Types:   Possibility and Risk of blood transfusion discussed: No  NPO instructions given:   Additional anesthetic preparation and risks discussed:   Needs early admission to pre-op area:   Other:     PAC Resident/NP Anesthesia Assessment:  53 year old male for transurethral incision of the prostate (laser) with Dr. Sumit Felton, on 2/21/19, at [facility], for diagnosis and treatment of BPH with obstructive sx. PAC referral for risk assessment and optimization for anesthesia.   Pre-operative considerations include:  1.) CV: Functional status independent. Exercise tolerance > 4 METS. Past tobacco smoker x 12 years quit age 25. No other identifiable cardiac risks.   RCRI = 0 reflecting 0.4% risk of major adverse cardiac event  No further cardiac evaluation indicated  2.) Pulmonary: smoking history as above. Wheezing reaction to environmental inhalants in past / no recent exposures and no use of inhalers.  MONROE risk is 2/8 = low risk  3.) Heme: Malaria infection with hx of 5-6 episodes of " recurrence, easily treated with medication with no hospitalizations or known liver ds.  No blood transfusions. VTE risk is low. CBC today  4.) Renal/ Urology: BPH on alfuzosin.   Procedure as detailed. Urinalysis today, BMP  5.) Endo: Subclinical hyperthyroidism followed at [facility] endocrine clinic and scheduled for thyroid biopsy of nodule later today. TSH low, T3 and T4 normal.    Anesthesia; Previous surgery in [facility] (herniorraphy) -no record; no known complications with anesthesia.   Pt discussed with Dr. Read.      Reviewed and Signed by PAC Mid-Level Provider/Resident  Mid-Level Provider/Resident: Rut Bean PA-C  Date: 12/7/19  Time: 8:44 AM    Attending Anesthesiologist Anesthesia Assessment:  53 for TURP in management of BPH. Patient is active, no cardiac or pulmonary disease.    Patient/case discussed with TAHMINA/resident; agree with above assessment. No need to see patient. Patient is appropriate for the planned procedure without further work-up or medical management.      Reviewed and Signed by PAC Anesthesiologist  Anesthesiologist: fabian  Date: 2/7/2019  Time:   Pass/Fail: Pass  Disposition:     PAC Pharmacist Assessment:        Pharmacist:   Date:   Time:        WIL Cornelius

## 2019-02-13 ENCOUNTER — OFFICE VISIT (OUTPATIENT)
Dept: UROLOGY | Facility: CLINIC | Age: 54
End: 2019-02-13
Payer: COMMERCIAL

## 2019-02-13 ENCOUNTER — ANCILLARY PROCEDURE (OUTPATIENT)
Dept: RADIOLOGY | Facility: AMBULATORY SURGERY CENTER | Age: 54
End: 2019-02-13
Attending: PHYSICIAN ASSISTANT
Payer: COMMERCIAL

## 2019-02-13 VITALS
HEIGHT: 68 IN | SYSTOLIC BLOOD PRESSURE: 119 MMHG | WEIGHT: 185 LBS | DIASTOLIC BLOOD PRESSURE: 80 MMHG | BODY MASS INDEX: 28.04 KG/M2

## 2019-02-13 DIAGNOSIS — R39.9 LOWER URINARY TRACT SYMPTOMS (LUTS): ICD-10-CM

## 2019-02-13 DIAGNOSIS — R39.198 SLOWING OF URINARY STREAM: Primary | ICD-10-CM

## 2019-02-13 LAB
APPEARANCE UR: CLEAR
BILIRUB UR QL: NORMAL
COLOR UR: YELLOW
GLUCOSE URINE: NORMAL MG/DL
HGB UR QL: NORMAL
KETONES UR QL: NORMAL MG/DL
LEUKOCYTE ESTERASE URINE: NORMAL
NITRITE UR QL STRIP: NORMAL
PH UR STRIP: 5.5 PH (ref 5–7)
PROTEIN ALBUMIN URINE: NORMAL MG/DL
SOURCE: NORMAL
SP GR UR STRIP: 1.01 (ref 1–1.03)
UROBILINOGEN UR QL STRIP: 0.2 EU/DL (ref 0.2–1)

## 2019-02-13 RX ORDER — MOMETASONE FUROATE MONOHYDRATE 50 UG/1
2 SPRAY, METERED NASAL
COMMUNITY
Start: 2019-02-12 | End: 2022-05-18

## 2019-02-13 RX ORDER — HYDROCORTISONE 2.5 %
CREAM (GRAM) TOPICAL
COMMUNITY
Start: 2019-02-08 | End: 2022-05-18

## 2019-02-13 RX ORDER — LEVOTHYROXINE SODIUM 25 UG/1
25 TABLET ORAL
COMMUNITY
Start: 2019-02-11 | End: 2020-02-11

## 2019-02-13 ASSESSMENT — PAIN SCALES - GENERAL: PAINLEVEL: NO PAIN (0)

## 2019-02-13 ASSESSMENT — MIFFLIN-ST. JEOR: SCORE: 1663.65

## 2019-02-13 NOTE — PROGRESS NOTES
PREPROCEDURE DIAGNOSES:    1. LUTS characterized by slow/weak stream    POSTPROCEDURE DIAGNOSES:  -Normal bladder capacity (500 mL) with normal filling sensations.  -Normal bladder compliance without DO/DOI or ROBY.  -Fair detrusor contraction during voiding to max Pdet 22 cm H2O. This does not represent a smooth, sustained contraction; rather, it appears quite interrupted.  -Slow flow (Qmax 7.4 ml/s) with a prolonged, intermittent flow curve but good bladder emptying (final PVR 0 mL).  -Some increased EMG activity during voiding which likely correlates with increased pelvic myofascial tone.   -No objective evidence for bladder outlet obstruction (BOOI 6.9).  -Fluoroscopy reveals a smooth-walled bladder without diverticuli or VUR. The bladder neck was closed during filling. Could not capture voiding images as patient required standing to void and could not get bladder neck in view due to equipment/C-arm limitations.     PROCEDURE:    1. Uroflowmetry.  2. Sterile urethral catheterization for measurement of postvoid residual urine volume.  3. Complex filling cystometrogram with measurement of bladder and rectal pressures.  4. Complex voiding cystometrogram with measurement of bladder and rectal pressures.  5. Electromyography of the pelvic floor during urodynamics.  6. Fluoroscopic imaging of the bladder during urodynamics, at least 3 views.    7. Interpretation of urodynamics and flouroscopic imaging.      INDICATIONS FOR PROCEDURE:  Mr. Erik Stark is a pleasant 53 year old male with a history of LUTS characterized by slow weak stream. Recent cystoscopy did not show evidence for significant prostatic hypertrophy or outlet obstruction. Baseline video urodynamic assessment is requested today by Dr. Felton to better characterize Mr. Erik Stark's voiding dysfunction.      VOIDING DIARY:  Voids every 2-4 hours during the day, nocturia x 1-2.  Episodes of incontinence: nond.  Incontinence associated with:  n/a.  Total Volume Intake: 2600 mL; mostly water, some milk, mocha coffee, and vodka.  Total Volume Output: 3130 mL; average voided volume 285 mL, largest voided volume 445 mL.    DESCRIPTION OF PROCEDURE:  Risks, benefits, and alternatives to urodynamics were discussed with the patient and he wished to proceed.  Urodynamics are planned to better assess the primary etiology for Mr. Stark's urologic dysfunction.  The patient does not currently take anticholinergic medications for his bladder.  After informed consent was obtained, the patient was taken to the procedure room where uroflowmetry was performed. Findings below.     PRE-STUDY UROFLOWMETRY:  Voided volume: 385 mL.  Maximum flow rate: 8.4 mL/sec.  Average flow rate: 5.9 mL/sec.  Character of the curve: low flow.  Postvoid residual by catheter: 60 mL.  Pretest urine dipstick was negative for leukocytes and nitrites.    Next a 7F double-lumen urodynamics catheter was inserted into the bladder under sterile technique via urethra.  A 7F abdominal manometry catheter was placed in the rectum.  EMG pads were placed on both sides of the anal verge.  The bladder was filled with 200 mL of Cystografin at 50 mL/minute and serial pressures were recorded.  With coughing there was an appropriate rise in vesical and abdominal pressures with no change in detrusor pressure, confirming good study catheter placement.    DURING THE FILLING PHASE:  First sensation: 48 mL.  First Desire: 183 mL.  Strong Desire: 345 mL.  Maximum Capacity: 406 mL. True half-way 500 mL based on final voided volume.    Uninhibited detrusor contractions: none.  Compliance: normal. PDet=7.5 cmH20 at capacity. Compliance ratio of 67.  Continence: no DOI or ROBY.  EMG: mostly concordant during filling.    DURING THE VOIDING PHASE:  Maximum detrusor contraction with void: 22 cm of H2O pressure. His detrusor contraction is not smooth and sustained; rather, it is interrupted.  Voided volume: 500 mL.  Maximum flow  rate: 7.4 mL/sec.  Average flow rate: 3.2 mL/sec.  Postvoid Residual: 0 mL.  EMG activity: mild increased EMG activity during voiding.  Character of voiding curve: intermittent low flow.  BOOI: 6.9 (suggesting no obstruction - see key below)  [obstructed (OSBORNE index [BOOI] ? 40); equivocal (no definite   obstruction; BOOI 20-40); and no obstruction (BOOI ? 20)]    FLUOROSCOPIC IMAGING OF THE BLADDER DURING URODYNAMICS:  Fluoroscopy during today's procedure demonstrated a smooth walled bladder without diverticulae or cellules.  No vesicoureteral reflux was observed.  The bladder neck was closed during filling. Could not capture voiding images as patient required standing to void and could not get bladder neck in view due to equipment/C-arm limitations.  After voiding to completion, all catheters were removed and the patient was brought back into the consultation room to further discuss today's study results.      ASSESSMENT/PLAN:  Mr. Erik Stark is a pleasant 53 year old male with LUTS who demonstrated the following findings today on urodynamic evaluation:    -Normal bladder capacity (500 mL) with normal filling sensations.  -Normal bladder compliance without DO/DOI or ROBY.  -Fair detrusor contraction during voiding to max Pdet 22 cm H2O. This does not represent a smooth, sustained contraction; rather, it appears quite interrupted.  -Slow flow (Qmax 7.4 ml/s) with a prolonged, intermittent flow curve but good bladder emptying (final PVR 0 mL).  -Some increased EMG activity during voiding which likely correlates with increased pelvic myofascial tone.   -No objective evidence for bladder outlet obstruction (BOOI 6.9).  -Fluoroscopy reveals a smooth-walled bladder without diverticuli or VUR. The bladder neck was closed during filling. Could not capture voiding images as patient required standing to void and could not get bladder neck in view due to equipment/C-arm limitations.       Patient is currently scheduled  for TUIP with holmium laser with Dr. Felton on 2/12/19.   Based on results, outlet obstruction is not objectively confirmed. Favor high tone pelvic floor dysfunction.  Staff message sent to Dr. Felton with results and update. Patient informed that he will be contacted with additional or different recommendations pending review by Dr. Felton.    - A single Cipro antibiotic was provided for UTI prophylaxis following completion of today's study per department protocol.  The risk of UTI with VUDS is low at ~2.5-3%.      Thank you for allowing me to participate in the care of Mr. Erik Stark and please don't hesitate to contact me with any questions or concerns.      CHRIS CarlinC  Urology Physician Assistant

## 2019-02-13 NOTE — LETTER
RE: Erik Stark  [address] [address]  [facility] [address]     Dear Colleague,    Thank you for referring your patient, Erik Stark, to the [facility] UROLOGY AND INST FOR PROSTATE AND UROLOGIC CANCERS at [facility]. Please see a copy of my visit note below.    PREPROCEDURE DIAGNOSES:    1. LUTS characterized by slow/weak stream    POSTPROCEDURE DIAGNOSES:  -Normal bladder capacity (500 mL) with normal filling sensations.  -Normal bladder compliance without DO/DOI or ROBY.  -Fair detrusor contraction during voiding to max Pdet 22 cm H2O. This does not represent a smooth, sustained contraction; rather, it appears quite interrupted.  -Slow flow (Qmax 7.4 ml/s) with a prolonged, intermittent flow curve but good bladder emptying (final PVR 0 mL).  -Some increased EMG activity during voiding which likely correlates with increased pelvic myofascial tone.   -No objective evidence for bladder outlet obstruction (BOOI 6.9).  -Fluoroscopy reveals a smooth-walled bladder without diverticuli or VUR. The bladder neck was closed during filling. Could not capture voiding images as patient required standing to void and could not get bladder neck in view due to equipment/C-arm limitations.     PROCEDURE:    1. Uroflowmetry.  2. Sterile urethral catheterization for measurement of postvoid residual urine volume.  3. Complex filling cystometrogram with measurement of bladder and rectal pressures.  4. Complex voiding cystometrogram with measurement of bladder and rectal pressures.  5. Electromyography of the pelvic floor during urodynamics.  6. Fluoroscopic imaging of the bladder during urodynamics, at least 3 views.    7. Interpretation of urodynamics and flouroscopic imaging.      INDICATIONS FOR PROCEDURE:  Mr. Erik Stark is a pleasant 53 year old male with a history of LUTS characterized by slow weak stream. Recent cystoscopy did not show evidence for significant prostatic hypertrophy or outlet  obstruction. Baseline video urodynamic assessment is requested today by Dr. Felton to better characterize Mr. Erik Stark's voiding dysfunction.      VOIDING DIARY:  Voids every 2-4 hours during the day, nocturia x 1-2.  Episodes of incontinence: nond.  Incontinence associated with: n/a.  Total Volume Intake: 2600 mL; mostly water, some milk, mocha coffee, and vodka.  Total Volume Output: 3130 mL; average voided volume 285 mL, largest voided volume 445 mL.    DESCRIPTION OF PROCEDURE:  Risks, benefits, and alternatives to urodynamics were discussed with the patient and he wished to proceed.  Urodynamics are planned to better assess the primary etiology for Mr. Stark's urologic dysfunction.  The patient does not currently take anticholinergic medications for his bladder.  After informed consent was obtained, the patient was taken to the procedure room where uroflowmetry was performed. Findings below.     PRE-STUDY UROFLOWMETRY:  Voided volume: 385 mL.  Maximum flow rate: 8.4 mL/sec.  Average flow rate: 5.9 mL/sec.  Character of the curve: low flow.  Postvoid residual by catheter: 60 mL.  Pretest urine dipstick was negative for leukocytes and nitrites.    Next a 7F double-lumen urodynamics catheter was inserted into the bladder under sterile technique via urethra.  A 7F abdominal manometry catheter was placed in the rectum.  EMG pads were placed on both sides of the anal verge.  The bladder was filled with 200 mL of Cystografin at 50 mL/minute and serial pressures were recorded.  With coughing there was an appropriate rise in vesical and abdominal pressures with no change in detrusor pressure, confirming good study catheter placement.    DURING THE FILLING PHASE:  First sensation: 48 mL.  First Desire: 183 mL.  Strong Desire: 345 mL.  Maximum Capacity: 406 mL. True skilled nursing 500 mL based on final voided volume.    Uninhibited detrusor contractions: none.  Compliance: normal. PDet=7.5 cmH20 at capacity. Compliance  ratio of 67.  Continence: no DOI or ROBY.  EMG: mostly concordant during filling.    DURING THE VOIDING PHASE:  Maximum detrusor contraction with void: 22 cm of H2O pressure. His detrusor contraction is not smooth and sustained; rather, it is interrupted.  Voided volume: 500 mL.  Maximum flow rate: 7.4 mL/sec.  Average flow rate: 3.2 mL/sec.  Postvoid Residual: 0 mL.  EMG activity: mild increased EMG activity during voiding.  Character of voiding curve: intermittent low flow.  BOOI: 6.9 (suggesting no obstruction - see key below)  [obstructed (OSBORNE index [BOOI] ? 40); equivocal (no definite   obstruction; BOOI 20-40); and no obstruction (BOOI ? 20)]    FLUOROSCOPIC IMAGING OF THE BLADDER DURING URODYNAMICS:  Fluoroscopy during today's procedure demonstrated a smooth walled bladder without diverticulae or cellules.  No vesicoureteral reflux was observed.  The bladder neck was closed during filling. Could not capture voiding images as patient required standing to void and could not get bladder neck in view due to equipment/C-arm limitations.  After voiding to completion, all catheters were removed and the patient was brought back into the consultation room to further discuss today's study results.      ASSESSMENT/PLAN:  Mr. Erik Stark is a pleasant 53 year old male with LUTS who demonstrated the following findings today on urodynamic evaluation:    -Normal bladder capacity (500 mL) with normal filling sensations.  -Normal bladder compliance without DO/DOI or ROBY.  -Fair detrusor contraction during voiding to max Pdet 22 cm H2O. This does not represent a smooth, sustained contraction; rather, it appears quite interrupted.  -Slow flow (Qmax 7.4 ml/s) with a prolonged, intermittent flow curve but good bladder emptying (final PVR 0 mL).  -Some increased EMG activity during voiding which likely correlates with increased pelvic myofascial tone.   -No objective evidence for bladder outlet obstruction (BOOI  6.9).  -Fluoroscopy reveals a smooth-walled bladder without diverticuli or VUR. The bladder neck was closed during filling. Could not capture voiding images as patient required standing to void and could not get bladder neck in view due to equipment/C-arm limitations.     Patient is currently scheduled for TUIP with holmium laser with Dr. Felton on 2/12/19.   Based on results, outlet obstruction is not objectively confirmed. Favor high tone pelvic floor dysfunction.  Staff message sent to Dr. Felton with results and update. Patient informed that he will be contacted with additional or different recommendations pending review by Dr. Felton.    - A single Cipro antibiotic was provided for UTI prophylaxis following completion of today's study per department protocol.  The risk of UTI with VUDS is low at ~2.5-3%.      Thank you for allowing me to participate in the care of Mr. Erik Stark and please don't hesitate to contact me with any questions or concerns.      Eun Boone PA-C  Urology Physician Assistant

## 2019-02-13 NOTE — NURSING NOTE
"Pt has a hx of LUTS and is here for UDS.  Uro-flow was obtained at rooming.    Pt denies dysuria, gross hematuria, incontinence, increased frq, pain, and felling of incomplete emptying.      Chief Complaint   Patient presents with     Urodynamics Study     LUTS       Blood pressure 119/80, height 1.727 m (5' 8\"), weight 83.9 kg (185 lb). Body mass index is 28.13 kg/m .    There is no problem list on file for this patient.      Allergies   Allergen Reactions     Peanuts [Nuts]        Current Outpatient Medications   Medication Sig Dispense Refill     levothyroxine (SYNTHROID/LEVOTHROID) 25 MCG tablet Take 25 mcg by mouth       mometasone (NASONEX) 50 MCG/ACT nasal spray 2 sprays       alfuzosin (UROXATRAL) 10 MG 24 hr tablet Take 1 tablet (10 mg) by mouth daily (Patient taking differently: Take 10 mg by mouth At Bedtime ) 30 tablet 11     fluticasone (FLONASE) 50 MCG/ACT nasal spray 2 sprays daily       hydrocortisone 2.5 % cream        loratadine (CLARITIN) 10 MG tablet Take 10 mg by mouth as needed       multivitamins w/minerals tablet Take 1 tablet by mouth At Bedtime       vitamin D3 (CHOLECALCIFEROL) 2000 units tablet Take 2,000 Units by mouth At Bedtime         Social History     Tobacco Use     Smoking status: Former Smoker     Smokeless tobacco: Never Used   Substance Use Topics     Alcohol use: Not on file     Drug use: Not on file       See [place], EMT  2/13/2019  2:57 PM    "

## 2019-02-13 NOTE — NURSING NOTE
"Chief Complaint   Patient presents with     Urodynamics Study     LUTS       Blood pressure 119/80, height 1.727 m (5' 8\"), weight 83.9 kg (185 lb). Body mass index is 28.13 kg/m .    There is no problem list on file for this patient.      Allergies   Allergen Reactions     Peanuts [Nuts]        Current Outpatient Medications   Medication Sig Dispense Refill     levothyroxine (SYNTHROID/LEVOTHROID) 25 MCG tablet Take 25 mcg by mouth       mometasone (NASONEX) 50 MCG/ACT nasal spray 2 sprays       alfuzosin (UROXATRAL) 10 MG 24 hr tablet Take 1 tablet (10 mg) by mouth daily (Patient taking differently: Take 10 mg by mouth At Bedtime ) 30 tablet 11     fluticasone (FLONASE) 50 MCG/ACT nasal spray 2 sprays daily       hydrocortisone 2.5 % cream        loratadine (CLARITIN) 10 MG tablet Take 10 mg by mouth as needed       multivitamins w/minerals tablet Take 1 tablet by mouth At Bedtime       vitamin D3 (CHOLECALCIFEROL) 2000 units tablet Take 2,000 Units by mouth At Bedtime         Social History     Tobacco Use     Smoking status: Former Smoker     Smokeless tobacco: Never Used   Substance Use Topics     Alcohol use: Not on file     Drug use: Not on file       Invasive Procedure Safety Checklist:    Procedure: Urodynamics Study    Action: Complete sections and checkboxes as appropriate.    Pre-procedure:  1. Patient ID Verified with 2 identifiers (Gloria and  or MRN) : YES    2. Procedure and site verified with patient/designee (when able) : YES    3. Accurate consent documentation in medical record : YES    4. H&P (or appropriate assessment) documented in medical record : N/A  H&P must be up to 30 days prior to procedure an updated within 24 hours of                 Procedure as applicable.     5. Relevant diagnostic and radiology test results appropriately labeled and displayed as applicable : YES    6. Blood products, implants, devices, and/or special equipment available for the procedure as applicable : YES    7. " Procedure site(s) marked with provider initials [Exclusions: none] : NO    8. Marking not required. Reason : Yes  Procedure does not require site marking    Time Out:     Time-Out performed immediately prior to starting procedure, including verbal and active participation of all team members addressing: YES    1. Correct patient identity.  2. Confirmed that the correct side and site are marked.  3. An accurate procedure to be done.  4. Agreement on the procedure to be done.  5. Correct patient position.  6. Relevant images and results are properly labeled and appropriately displayed.  7. The need to administer antibiotics or fluids for irrigation purposes during the procedure as applicable.  8. Safety precautions based on patient history or medication use.    During Procedure: Verification of correct person, site, and procedure occurs any time the responsibility for care of the patient is transferred to another member of the care team.    The following medication was given:     MEDICATION:  Ciprofloxacin  ROUTE: PO  SITE: Medication was given orally   DOSE: 500mg  LOT #: [id number]  : [email or web address]  EXPIRATION DATE: 08/20  NDC#: [address]-12  Was there drug waste? No    Prior to administration, verified patient identity using patient's name and date of birth.  Due to administration, patient instructed to remain in clinic for 15 minutes  afterwards, and to report any adverse reaction to me immediately.    Drug Amount Wasted:  None.  Vial/Syringe: Single dose vial      BRIAN Price  2/13/2019  3:06 PM

## 2019-02-13 NOTE — PATIENT INSTRUCTIONS
Dr. Felton will notify you if there are any changes.    It was a pleasure meeting with you today.  Thank you for allowing me and my team the privilege of caring for you today.  YOU are the reason we are here, and I truly hope we provided you with the excellent service you deserve.  Please let us know if there is anything else we can do for you so that we can be sure you are leaving completely satisfied with your care experience.        JAMEL Escoto

## 2019-02-18 DIAGNOSIS — M62.89 PELVIC FLOOR DYSFUNCTION: Primary | ICD-10-CM

## 2019-02-25 ENCOUNTER — THERAPY VISIT (OUTPATIENT)
Dept: PHYSICAL THERAPY | Facility: CLINIC | Age: 54
End: 2019-02-25
Payer: COMMERCIAL

## 2019-02-25 DIAGNOSIS — M62.89 PELVIC FLOOR DYSFUNCTION: ICD-10-CM

## 2019-02-25 DIAGNOSIS — R39.198 SLOW URINARY STREAM: ICD-10-CM

## 2019-02-25 DIAGNOSIS — M99.05 SOMATIC DYSFUNCTION OF PELVIC REGION: ICD-10-CM

## 2019-02-25 PROCEDURE — 97112 NEUROMUSCULAR REEDUCATION: CPT | Mod: GP | Performed by: PHYSICAL THERAPIST

## 2019-02-25 PROCEDURE — 97161 PT EVAL LOW COMPLEX 20 MIN: CPT | Mod: GP | Performed by: PHYSICAL THERAPIST

## 2019-02-25 PROCEDURE — 97110 THERAPEUTIC EXERCISES: CPT | Mod: GP | Performed by: PHYSICAL THERAPIST

## 2019-02-25 NOTE — LETTER
[facility]  [address] [address]  [facility] [address]  [phone]    2019    Re: Erik Stark   :   1965  MRN:  [id number]   REFERRING PHYSICIAN:   Sumit CLEMENS PT  Date of Initial Evaluation:  2019  Visits:  Rxs Used: 1  Reason for Referral:     Pelvic floor dysfunction  Somatic dysfunction of pelvic region  Slow urinary stream    [facility] Initial Evaluation  Subjective:  History of slow urinary stream for 5 years of unknown etiology. Recent testing shows increased pelvic floor activity during urination. Pt referred to MD for physical therapy on 19.  The history is provided by the patient. No  was used.   Erik Stark is a 53 year old male with a pelvic dysfunction condition.  Condition occurred with:  Insidious onset.  Condition occurred: for unknown reasons.  This is a chronic condition     Patient reports pain:  N/a.      Pain Scale: 0/10.   Pain is the same all the time.  Exacerbated by: needing to urinate. Relieved by: urinating.  Since onset symptoms are unchanged.  Special testing: see report.  Previous treatment: none.      Pertinent medical history includes:  Asthma.  Medical allergies: none.  Other surgeries include:  Orthopedic surgery and other (knee A/S, hernia).  Medication history: thyroid medication.    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    Barriers include:  None as reported by the patient.  Red flags:  None as reported by the patient.    Objective:  Flexibility/Screens:   Lower Extremity:  Decreased left lower extremity flexibility:Adductors; Piriformis; Hip Flexors and Hamstrings  Decreased right lower extremity flexibility:  Adductors; Piriformis; Hip Flexors and Hamstrings    Pelvic Dysfunction Evaluation:    Bladder/Pelvic Problems:  Lumbar AROM WNL and did not  reproduce symptoms.     External Assessment:    Skin Condition:  Normal  Bearing Down/Coughing:  Normal  Tissue Symmetry:  Normal  Internal Assessment:    Sensory Exam:  Normal            Re: Erik Stark   :   1965    SEMG Biofeedback:    Baseline EMG PM:  5 MV at rest, pt was able to decrease to 1.5 mV with relaxation exercises.   General   ROS  Assessment/Plan:    Patient is a 53 year old male with pelvic complaints.    Patient has the following significant findings with corresponding treatment plan.                Diagnosis 1:  Slow urinary stream, pelvic dysfunction.   Impaired muscle performance - biofeedback, neuro re-education and home program  Therapy Evaluation Codes:   1) History comprised of:   Personal factors thats impact the plan of care:      Time since onset of symptoms.    Comorbidity factors that impact the plan of care are:      Asthma.     Medications impacting care: thyroid medication.  2) Examination of Body Systems comprised of:   Body structures and functions that impact the plan of care:      Pelvis.   Activity limitations that impact the plan of care are:      Urgency and urinating.  3) Clinical presentation characteristics are:   Stable/Uncomplicated.  4) Decision-Making    Low complexity using standardized patient assessment instrument and/or measureable assessment of functional outcome.  Cumulative Therapy Evaluation is: Low complexity.  Previous and current functional limitations:  (See Goal Flow Sheet for this information)    Short term and Long term goals: (See Goal Flow Sheet for this information)   Communication ability:  Patient appears to be able to clearly communicate and understand verbal and written communication and follow directions correctly.  Treatment Explanation - The following has been discussed with the patient:   RX ordered/plan of care  Anticipated outcomes  Possible risks and side effects  This patient would benefit from PT intervention to resume normal  activities.   Rehab potential is good.  Frequency:  1 X week, once daily  Duration:  for 6 weeks  Discharge Plan:  Achieve all LTG.  Independent in home treatment program.  Reach maximal therapeutic benefit.  Please refer to the daily flowsheet for treatment today, total treatment time and time spent performing 1:1 timed codes.     Thank you for your referral.    INQUIRIES  Therapist: Maxwell Arriaga PT  CAROLA [facility] PT  [address] [address] [address]  Phone: [phone]  Fax: [phone]

## 2019-02-25 NOTE — PROGRESS NOTES
[facility] Initial Evaluation  Subjective:  History of slow urinary stream for 5 years of unknown etiology. Recent testing shows increased pelvic floor activity during urination. Pt referred to MD for physical therapy on 2-13-19.      The history is provided by the patient. No  was used.   Erik Stark is a 53 year old male with a pelvic dysfunction condition.  Condition occurred with:  Insidious onset.  Condition occurred: for unknown reasons.  This is a chronic condition     Patient reports pain:  N/a.      Pain Scale: 0/10.   Pain is the same all the time.  Exacerbated by: needing to urinate. Relieved by: urinating.  Since onset symptoms are unchanged.  Special testing: see report.  Previous treatment: none.      Pertinent medical history includes:  Asthma.  Medical allergies: none.  Other surgeries include:  Orthopedic surgery and other (knee A/S, hernia).  Medication history: thyroid medication.    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    Barriers include:  None as reported by the patient.    Red flags:  None as reported by the patient.                        Objective:        Flexibility/Screens:       Lower Extremity:  Decreased left lower extremity flexibility:Adductors; Piriformis; Hip Flexors and Hamstrings    Decreased right lower extremity flexibility:  Adductors; Piriformis; Hip Flexors and Hamstrings                                       Pelvic Dysfunction Evaluation:    Bladder/Pelvic Problems:  Lumbar AROM WNL and did not reproduce symptoms.                       External Assessment:    Skin Condition:  Normal    Bearing Down/Coughing:  Normal  Tissue Symmetry:  Normal      Internal Assessment:    Sensory Exam:  Normal            SEMG Biofeedback:          Baseline EMG PM:  5 MV at rest, pt was able to decrease to 1.5 mV with relaxation exercises.                                   General     ROS    Assessment/Plan:    Patient is a 53 year old male with pelvic complaints.    Patient has the following significant findings with corresponding treatment plan.                Diagnosis 1:  Slow urinary stream, pelvic dysfunction.   Impaired muscle performance - biofeedback, neuro re-education and home program    Therapy Evaluation Codes:   1) History comprised of:   Personal factors thats impact the plan of care:      Time since onset of symptoms.    Comorbidity factors that impact the plan of care are:      Asthma.     Medications impacting care: thyroid medication.  2) Examination of Body Systems comprised of:   Body structures and functions that impact the plan of care:      Pelvis.   Activity limitations that impact the plan of care are:      Urgency and urinating.  3) Clinical presentation characteristics are:   Stable/Uncomplicated.  4) Decision-Making    Low complexity using standardized patient assessment instrument and/or measureable assessment of functional outcome.  Cumulative Therapy Evaluation is: Low complexity.    Previous and current functional limitations:  (See Goal Flow Sheet for this information)    Short term and Long term goals: (See Goal Flow Sheet for this information)     Communication ability:  Patient appears to be able to clearly communicate and understand verbal and written communication and follow directions correctly.  Treatment Explanation - The following has been discussed with the patient:   RX ordered/plan of care  Anticipated outcomes  Possible risks and side effects  This patient would benefit from PT intervention to resume normal activities.   Rehab potential is good.    Frequency:  1 X week, once daily  Duration:  for 6 weeks  Discharge Plan:  Achieve all LTG.  Independent in home treatment program.  Reach maximal therapeutic benefit.    Please refer to the daily flowsheet for treatment today, total treatment time and time  spent performing 1:1 timed codes.

## 2019-02-27 NOTE — PROGRESS NOTES
Chief Complaint   Patient presents with     RECHECK     Follow up plantar fasciitis            Allergies   Allergen Reactions     Peanuts [Nuts]          Subjective: Erik is a 53 year old male who presents to the clinic today for a follow up of right calcaneal stress fx. He relates that he has been wearing the boot as directed. Relates that he is having less pain in the heel.     Objective  Data Unavailable Data Unavailable Data Unavailable Data Unavailable Data Unavailable 0 lbs 0 oz  Pain noted with palpation of the plantar calcaneus, centrally. Pain is transmitted through the calcaneus, deeper than the PF.   No edema or ecchymosis noted.   Pain noted on the right at the medial attachment of the plantar fascia on the calcaneus.     Assessment: Right calcaneal stress fx - improving. Still having pain with plantar palpation. I would recommend that we get BL MRIs to assess the soft tissues and the sustentaculum on the right. He agrees to this.     Plan:   - Pt seen and evaluated  - BL MRIs ordered. He will get these and follow up for results.   - No running for now.   - Pt to return to clinic s/p MRI.

## 2019-03-01 ENCOUNTER — ANCILLARY PROCEDURE (OUTPATIENT)
Dept: MRI IMAGING | Facility: CLINIC | Age: 54
End: 2019-03-01
Attending: PODIATRIST
Payer: COMMERCIAL

## 2019-03-01 ENCOUNTER — OFFICE VISIT (OUTPATIENT)
Dept: ORTHOPEDICS | Facility: CLINIC | Age: 54
End: 2019-03-01
Payer: COMMERCIAL

## 2019-03-01 DIAGNOSIS — M72.2 PLANTAR FASCIITIS, LEFT: ICD-10-CM

## 2019-03-01 DIAGNOSIS — M84.374D STRESS FRACTURE OF RIGHT CALCANEUS WITH ROUTINE HEALING, SUBSEQUENT ENCOUNTER: ICD-10-CM

## 2019-03-01 DIAGNOSIS — M72.2 PLANTAR FASCIITIS, LEFT: Primary | ICD-10-CM

## 2019-03-01 NOTE — NURSING NOTE
Reason For Visit:   Chief Complaint   Patient presents with     RECHECK     Follow up plantar fasciitis       There were no vitals taken for this visit.    Pain Assessment  Patient Currently in Pain: Yes  0-10 Pain Scale: 2  Primary Pain Location: Foot  Pain Descriptors: Sharp(deep)  Alleviating Factors: (boot)  Aggravating Factors: (walking without the boot. Patient states he has rested the foot mostly for the last couple of months - has not run yet)    Eun Clayton, ATC

## 2019-03-01 NOTE — LETTER
RE: Erik Stark  [address] [address]  [facility] [address]     Dear Colleague,    Thank you for referring your patient, Erik Stark, [facility]. Please see a copy of my visit note below.    Chief Complaint   Patient presents with     RECHECK     Follow up plantar fasciitis     Allergies   Allergen Reactions     Peanuts [Nuts]      Subjective: Erik is a 53 year old male who presents to the clinic today for a follow up of right calcaneal stress fx. He relates that he has been wearing the boot as directed. Relates that he is having less pain in the heel.     Objective  Data Unavailable Data Unavailable Data Unavailable Data Unavailable Data Unavailable 0 lbs 0 oz  Pain noted with palpation of the plantar calcaneus, centrally. Pain is transmitted through the calcaneus, deeper than the PF.   No edema or ecchymosis noted.   Pain noted on the right at the medial attachment of the plantar fascia on the calcaneus.     Assessment: Right calcaneal stress fx - improving. Still having pain with plantar palpation. I would recommend that we get BL MRIs to assess the soft tissues and the sustentaculum on the right. He agrees to this.     Plan:   - Pt seen and evaluated  - BL MRIs ordered. He will get these and follow up for results.   - No running for now.   - Pt to return to clinic s/p MRI.     Again, thank you for allowing me to participate in the care of your patient.      Sincerely,    Emery Shipley DPM

## 2019-03-05 ENCOUNTER — THERAPY VISIT (OUTPATIENT)
Dept: PHYSICAL THERAPY | Facility: CLINIC | Age: 54
End: 2019-03-05
Payer: COMMERCIAL

## 2019-03-05 DIAGNOSIS — R39.198 SLOW URINARY STREAM: ICD-10-CM

## 2019-03-05 DIAGNOSIS — M99.05 SOMATIC DYSFUNCTION OF PELVIC REGION: ICD-10-CM

## 2019-03-05 PROCEDURE — 97110 THERAPEUTIC EXERCISES: CPT | Mod: GP | Performed by: PHYSICAL THERAPIST

## 2019-03-05 PROCEDURE — 97112 NEUROMUSCULAR REEDUCATION: CPT | Mod: GP | Performed by: PHYSICAL THERAPIST

## 2019-03-22 ENCOUNTER — OFFICE VISIT (OUTPATIENT)
Dept: ORTHOPEDICS | Facility: CLINIC | Age: 54
End: 2019-03-22
Payer: COMMERCIAL

## 2019-03-22 DIAGNOSIS — M72.2 PLANTAR FASCIITIS: Primary | ICD-10-CM

## 2019-03-22 DIAGNOSIS — M76.71 PERONEAL TENDINITIS OF RIGHT LOWER EXTREMITY: ICD-10-CM

## 2019-03-22 DIAGNOSIS — M77.8 EXTENSOR TENDONITIS OF FOOT: ICD-10-CM

## 2019-03-22 RX ORDER — LIDOCAINE HYDROCHLORIDE 10 MG/ML
5 INJECTION, SOLUTION EPIDURAL; INFILTRATION; INTRACAUDAL; PERINEURAL
Status: SHIPPED | OUTPATIENT
Start: 2019-03-22

## 2019-03-22 RX ORDER — TRIAMCINOLONE ACETONIDE 40 MG/ML
20 INJECTION, SUSPENSION INTRA-ARTICULAR; INTRAMUSCULAR
Status: SHIPPED | OUTPATIENT
Start: 2019-03-22

## 2019-03-22 RX ORDER — TESTOSTERONE CYPIONATE 200 MG/ML
1 INJECTION INTRAMUSCULAR
Status: SHIPPED | OUTPATIENT
Start: 2019-03-22

## 2019-03-22 RX ADMIN — LIDOCAINE HYDROCHLORIDE 5 ML: 10 INJECTION, SOLUTION EPIDURAL; INFILTRATION; INTRACAUDAL; PERINEURAL at 08:57

## 2019-03-22 RX ADMIN — TESTOSTERONE CYPIONATE 1 ML: 200 INJECTION INTRAMUSCULAR at 08:57

## 2019-03-22 RX ADMIN — TRIAMCINOLONE ACETONIDE 20 MG: 40 INJECTION, SUSPENSION INTRA-ARTICULAR; INTRAMUSCULAR at 08:57

## 2019-03-22 NOTE — LETTER
3/22/2019       RE: Erik Stark  [address] [id number]  [place] [address]     Dear Colleague,    Thank you for referring your patient, Erik Stark, [facility] the [facility]. Please see a copy of my visit note below.    Chief Complaint   Patient presents with     RECHECK     discuss MRI       Allergies   Allergen Reactions     Peanuts [Nuts]        Subjective: Erik is a 53 year old male who presents to the clinic today for a follow up of BL ankle MRIs. Relates that he does have some pain in the heels. Pain in 1/10. Relates that he has some pain in the outside of the right ankle and on the top of the left great toe. Pains come and go.     Objective    Pain noted today at the medial and central attachments of the plantar fascia on the calcaneus on the right foot. Pain along the course of the peroneus brevis on the right ankle. Some pain along the course of the EHL on the dorsal foot. No impediment of 1st MTPJ active or passive ROM    Right MRI Impression:  1. Corresponding to patient's marker, thickening with partial tear of  the central cord of plantar aponeurosis at the calcaneal attachment.  2. No stress reaction or occult fracture.  3. Severe tendinosis of the peroneus brevis tendon starting  approximately at the level of distal fibula extending approximately to  the level of the calcaneocuboid joint line.     ROSA CELESTE    Left MRI Impression:  1. No MRI evidence to suggest plantar fasciitis or underlying tear.   2. No occult fracture or stress reaction.  3. Split tearing of the peroneus brevis tendon with reconstitution  distally.     ROSA CELESTE    Assessment: Right foot plantar fasciitis.  Right foot peroneal tendonitis  Left foot EHL tendonitis.     Plan:   - Pt seen and evaluated  - He would like an injection today into the right heel. I discussed the risks, complications, benefits, alternatives and answered all of his questions. After skin  prep, and injection consisting of 2cc's of 1:1 mix of 1% lidocaine + Kenalog-40 + dexamethasone 4mg was injected into the right heel. He tolerated this well with no complications.   - I taught him how to tape the peroneal tendons with kinesiotape. This was done by me today.   - Pt to return to clinic PRN. He is moving to [place] next week and can seek treatment there if needed. Can start couch to 5K on Monday.Can bike this weekend.       Medium Joint Injection/Arthrocentesis: R ankle  Date/Time: 3/22/2019 8:57 AM  Performed by: Emery Shipley DPM  Authorized by: Emery Shipley DPM     Indications:  Pain  Needle Size:  25 G  Location:  Ankle  Location comment:  Right heel cortisone injection  Site comment:  Right heel cortisone injection  Site comment:  Right heel cortisone injection  Site comment:  Right heel cortisone injection  Site:  R ankle  Site comment:  Right heel cortisone injection  Medications:  20 mg triamcinolone 40 MG/ML; 5 mL lidocaine (PF) 1 %; 1 mL dexamethasone 120 MG/30ML  Procedure discussed: discussed risks, benefits, and alternatives    Consent Given by:  Patient  Timeout: timeout called immediately prior to procedure    Prep: patient was prepped and draped in usual sterile fashion        [facility]  [address] [place] [id number] [phone]  Dept: [phone]  ______________________________________________________________________________    Patient: Erik Stark   : 1965   MRN: [id number]   2019    INVASIVE PROCEDURE SAFETY CHECKLIST    Date: 3/22/2019   Procedure:right hell cortisone injection   Patient Name: Erik Stark  MRN: [id number]  YOB: 1965    Action: Complete sections as appropriate. Any discrepancy results in a HARD COPY until resolved.     PRE PROCEDURE:  Patient ID verified with 2 identifiers (name and  or MRN): Yes  Procedure and site verified with patient/designee (when able):  Yes  Accurate consent documentation in medical record: Yes  H&P (or appropriate assessment) documented in medical record: Yes  H&P must be up to 20 days prior to procedure and updates within 24 hours of procedure as applicable: Yes  Relevant diagnostic and radiology test results appropriately labeled and displayed as applicable: Yes  Procedure site(s) marked with provider initials: Yes    TIMEOUT:  Time-Out performed immediately prior to starting procedure, including verbal and active participation of all team members addressing the following:Yes  * Correct patient identify  * Confirmed that the correct side and site are marked  * An accurate procedure consent form  * Agreement on the procedure to be done  * Correct patient position  * Relevant images and results are properly labeled and appropriately displayed  * The need to administer antibiotics or fluids for irrigation purposes during the procedure as applicable   * Safety precautions based on patient history or medication use    DURING PROCEDURE: Verification of correct person, site, and procedures any time the responsibility for care of the patient is transferred to another member of the care team.     The following medications were given:     Prior to injection, verified patient identity using patient's name and date of birth.  Due to injection administration, patient instructed to remain in clinic for 15 minutes  afterwards, and to report any adverse reaction to me immediately.    Tendon sheath injection was performed.   Medication Name: lidocaine  Drug Amount Wasted:  Yes: 3 mg/ml   Vial/Syringe: Single dose vial  Expiration Date:  7/22    Eun Clayton ATC    Again, thank you for allowing me to participate in the care of your patient.      Sincerely,    Emery Shipley DPM

## 2019-03-22 NOTE — NURSING NOTE
Reason For Visit:   Chief Complaint   Patient presents with     RECHECK     discuss MRI       There were no vitals taken for this visit.    Pain Assessment  Patient Currently in Pain: Yes  0-10 Pain Scale: 1  Primary Pain Location: Judy Clayton ATC

## 2019-03-22 NOTE — PROGRESS NOTES
Chief Complaint   Patient presents with     RECHECK     discuss MRI            Allergies   Allergen Reactions     Peanuts [Nuts]          Subjective: Erik is a 53 year old male who presents to the clinic today for a follow up of BL ankle MRIs. Relates that he does have some pain in the heels. Pain in 1/10. Relates that he has some pain in the outside of the right ankle and on the top of the left great toe. Pains come and go.     Objective    Pain noted today at the medial and central attachments of the plantar fascia on the calcaneus on the right foot. Pain along the course of the peroneus brevis on the right ankle. Some pain along the course of the EHL on the dorsal foot. No impediment of 1st MTPJ active or passive ROM    Right MRI Impression:  1. Corresponding to patient's marker, thickening with partial tear of  the central cord of plantar aponeurosis at the calcaneal attachment.  2. No stress reaction or occult fracture.  3. Severe tendinosis of the peroneus brevis tendon starting  approximately at the level of distal fibula extending approximately to  the level of the calcaneocuboid joint line.     ROSA CELESTE    Left MRI Impression:  1. No MRI evidence to suggest plantar fasciitis or underlying tear.   2. No occult fracture or stress reaction.  3. Split tearing of the peroneus brevis tendon with reconstitution  distally.     ROSA CELESTE    Assessment: Right foot plantar fasciitis.  Right foot peroneal tendonitis  Left foot EHL tendonitis.     Plan:   - Pt seen and evaluated  - He would like an injection today into the right heel. I discussed the risks, complications, benefits, alternatives and answered all of his questions. After skin prep, and injection consisting of 2cc's of 1:1 mix of 1% lidocaine + Kenalog-40 + dexamethasone 4mg was injected into the right heel. He tolerated this well with no complications.   - I taught him how to tape the peroneal tendons with kinesiotape. This was done by  me today.   - Pt to return to clinic PRN. He is moving to [place] next week and can seek treatment there if needed. Can start couch to 5K on Monday.Can bike this weekend.

## 2019-03-22 NOTE — PROGRESS NOTES
Medium Joint Injection/Arthrocentesis: R ankle  Date/Time: 3/22/2019 8:57 AM  Performed by: Emery Shipley DPM  Authorized by: Emery Shipley DPM     Indications:  Pain  Needle Size:  25 G  Location:  Ankle  Location comment:  Right heel cortisone injection  Site comment:  Right heel cortisone injection  Site comment:  Right heel cortisone injection  Site comment:  Right heel cortisone injection  Site:  R ankle  Site comment:  Right heel cortisone injection  Medications:  20 mg triamcinolone 40 MG/ML; 5 mL lidocaine (PF) 1 %; 1 mL dexamethasone 120 MG/30ML  Procedure discussed: discussed risks, benefits, and alternatives    Consent Given by:  Patient  Timeout: timeout called immediately prior to procedure    Prep: patient was prepped and draped in usual sterile fashion            [facility]  [address] [place] [id number]  [phone]  Dept: [phone]  ______________________________________________________________________________    Patient: Erik Stark   : 1965   MRN: [id number]   2019    INVASIVE PROCEDURE SAFETY CHECKLIST    Date: 3/22/2019   Procedure:right hell cortisone injection   Patient Name: Erik Stark  MRN: [id number]  YOB: 1965    Action: Complete sections as appropriate. Any discrepancy results in a HARD COPY until resolved.     PRE PROCEDURE:  Patient ID verified with 2 identifiers (name and  or MRN): Yes  Procedure and site verified with patient/designee (when able): Yes  Accurate consent documentation in medical record: Yes  H&P (or appropriate assessment) documented in medical record: Yes  H&P must be up to 20 days prior to procedure and updates within 24 hours of procedure as applicable: Yes  Relevant diagnostic and radiology test results appropriately labeled and displayed as applicable: Yes  Procedure site(s) marked with provider initials: Yes    TIMEOUT:  Time-Out performed immediately  prior to starting procedure, including verbal and active participation of all team members addressing the following:Yes  * Correct patient identify  * Confirmed that the correct side and site are marked  * An accurate procedure consent form  * Agreement on the procedure to be done  * Correct patient position  * Relevant images and results are properly labeled and appropriately displayed  * The need to administer antibiotics or fluids for irrigation purposes during the procedure as applicable   * Safety precautions based on patient history or medication use    DURING PROCEDURE: Verification of correct person, site, and procedures any time the responsibility for care of the patient is transferred to another member of the care team.       The following medications were given:         Prior to injection, verified patient identity using patient's name and date of birth.  Due to injection administration, patient instructed to remain in clinic for 15 minutes  afterwards, and to report any adverse reaction to me immediately.    Tendon sheath injection was performed.   Medication Name: lidocaine  Drug Amount Wasted:  Yes: 3 mg/ml   Vial/Syringe: Single dose vial  Expiration Date:  7/22        Eun Clayton, ATC

## 2019-11-04 ENCOUNTER — HEALTH MAINTENANCE LETTER (OUTPATIENT)
Age: 54
End: 2019-11-04

## 2019-12-21 PROBLEM — R39.198 SLOW URINARY STREAM: Status: RESOLVED | Noted: 2019-02-25 | Resolved: 2019-12-21

## 2019-12-21 PROBLEM — M99.05 SOMATIC DYSFUNCTION OF PELVIC REGION: Status: RESOLVED | Noted: 2019-02-25 | Resolved: 2019-12-21

## 2019-12-21 NOTE — PROGRESS NOTES
Erik Stark was seen 2 times for evaluation and treatment.  Patient did not return for further treatment and current status is unknown.  Due to short treatment duration, no objective or functional changes were made.  Please see goal flow sheet from episode noted date below and initial evaluation for further information.    Linked Episodes   Type: Episode: Status: Noted: Resolved: Last update: Updated by:   PHYSICAL THERAPY pelvic 2-25-19 Active 2/25/2019  3/5/2019  4:41 PM Maxwell Arriaga, PT      Comments:

## 2020-11-16 ENCOUNTER — HEALTH MAINTENANCE LETTER (OUTPATIENT)
Age: 55
End: 2020-11-16

## 2021-09-18 ENCOUNTER — HEALTH MAINTENANCE LETTER (OUTPATIENT)
Age: 56
End: 2021-09-18

## 2022-01-08 ENCOUNTER — HEALTH MAINTENANCE LETTER (OUTPATIENT)
Age: 57
End: 2022-01-08

## 2022-05-17 RX ORDER — DIAZEPAM 2.5 MG/.5ML
GEL RECTAL
Status: CANCELLED | OUTPATIENT
Start: 2022-05-17

## 2022-05-17 NOTE — TELEPHONE ENCOUNTER
diazepam 10 mg SUPP  Last Written Prescription Date:  ?  Last Fill Quantity: ?,   # refills: ?  Last Office Visit :  2/13/2019  Future Office visit: None    Routing refill request to provider for review/approval because:  Not on refill protocol.   Please review and verify I pulled up correct medication for Pt care.   Thank you       Tonja Boykin RN  Central Triage Red Flags/Med Refills

## 2022-05-17 NOTE — TELEPHONE ENCOUNTER
[facility] Call Center    Phone Message    May a detailed message be left on voicemail: yes     Reason for Call: Medication Refill Request    Has the patient contacted the pharmacy for the refill? Yes   Name of medication being requested: Rectal valium suppositories. 10 mg per suppository.  Provider who prescribed the medication: Eun Boone  Pharmacy: [place], [place] - [address]  Date medication is needed: as soon as possible         Action Taken: Message routed to:  [facility] ([facility] URO    Travel Screening: Not Applicable

## 2022-05-18 ENCOUNTER — VIRTUAL VISIT (OUTPATIENT)
Dept: UROLOGY | Facility: CLINIC | Age: 57
End: 2022-05-18
Payer: COMMERCIAL

## 2022-05-18 DIAGNOSIS — M62.89 HIGH-TONE PELVIC FLOOR DYSFUNCTION: Primary | ICD-10-CM

## 2022-05-18 PROBLEM — M25.519 SHOULDER PAIN: Status: ACTIVE | Noted: 2020-05-06

## 2022-05-18 PROBLEM — G89.29 CHRONIC RIGHT SHOULDER PAIN: Status: ACTIVE | Noted: 2020-03-31

## 2022-05-18 PROBLEM — M25.511 CHRONIC RIGHT SHOULDER PAIN: Status: ACTIVE | Noted: 2020-03-31

## 2022-05-18 PROBLEM — M72.2 PLANTAR FASCIITIS OF LEFT FOOT: Status: ACTIVE | Noted: 2021-03-16

## 2022-05-18 PROBLEM — M79.672 PAIN IN LEFT FOOT: Status: ACTIVE | Noted: 2021-03-16

## 2022-05-18 PROBLEM — M65.4 TENOSYNOVITIS, DE QUERVAIN: Status: ACTIVE | Noted: 2020-03-31

## 2022-05-18 PROBLEM — E27.40: Status: ACTIVE | Noted: 2022-04-14

## 2022-05-18 PROBLEM — R17 ELEVATED BILIRUBIN: Status: ACTIVE | Noted: 2020-04-03

## 2022-05-18 PROBLEM — M72.2 PLANTAR FASCIAL FIBROMATOSIS OF BOTH FEET: Status: ACTIVE | Noted: 2019-06-07

## 2022-05-18 PROBLEM — E05.90 SUBCLINICAL HYPERTHYROIDISM: Status: ACTIVE | Noted: 2019-06-07

## 2022-05-18 PROBLEM — R39.9 LOWER URINARY TRACT SYMPTOMS (LUTS): Status: ACTIVE | Noted: 2019-06-07

## 2022-05-18 PROCEDURE — 99203 OFFICE O/P NEW LOW 30 MIN: CPT | Mod: 95 | Performed by: PHYSICIAN ASSISTANT

## 2022-05-18 RX ORDER — ONDANSETRON 4 MG/1
TABLET, FILM COATED ORAL
COMMUNITY
End: 2022-05-18

## 2022-05-18 RX ORDER — OXYCODONE HYDROCHLORIDE 5 MG/1
TABLET ORAL
COMMUNITY
End: 2022-05-18

## 2022-05-18 NOTE — TELEPHONE ENCOUNTER
Spoke to pt. Assisted pt to schedule video visit with provider for today.     Hyun Vicente RN MSN

## 2022-05-18 NOTE — LETTER
5/18/2022       RE: Erik Stark  [address] [address]  [facility] [address]     Dear Colleague,    Thank you for referring your patient, Erik Stark, to the [facility] [place] at [facility]. Please see a copy of my visit note below.    Erik is a 56 year old who is being evaluated via a billable video visit.      How would you like to obtain your AVS? MyChart  If the video visit is dropped, the invitation should be resent by: Text to cell phone: [phone]  Will anyone else be joining your video visit? No        Name: Erik Stark    MRN: [id number]   YOB: 1965                 Chief Complaint:   Medication Refill         Assessment and Plan:   56 year old male with obstructive LUTS and suspected high tone pelvic floor dysfunction, improved with PFPT, dietary modification, and PRN rectal Valium suppositories. Overall, he is doing well but requests a refill of his Valium suppositories in anticipation of an upcoming dive trip (scuba diving often prompts increased urinary hesitancy and difficulty voiding due to inability to relax the pelvic floor).  -Rectal Valium suppositories (10 mg) refilled to [facility].    Follow up as needed.      Eun Boone PA-C  May 18, 2022          History of Present Illness:   Erik Stark is a 56 year old male seen today via virtual for a medication refill. Last visit with us was 2/13/19. To briefly review, he presented to urology in 9/2018 with obstructive LUTS. Cystoscopy with Dr. Felton on 1/22/19 demonstrated minimal prostatic hypertrophy. He underwent UDS on 2/13/19 which did not reveal obvious outlet obstruction. Instead, there was concern for pelvic floor dysfunction with increased EMG activity during voiding. He was previously scheduled for TUIP with Dr. Felton but this was cancelled based on UDS findings and he was instead referred to pelvic floor physical  therapy. Attended some PT here in [place] which he did not find to be particularly helpful. He was also given a prescription for rectal Valium suppositories for as needed use on scuba diving trips when he often experiences urinary hesitancy and difficulty voiding.    He then moved to [place] where he found a pelvic  who has been immensely helpful. He has been working on dietary modification and the therapist has been doing some dry needling of the pelvic floor. He has done quite well with these things over the last few years with improvement in his LUTS.     Today, he requests a refill of rectal Valium suppositories in anticipation of an upcoming dive trip scheduled for this weekend.          Past Medical History:   No past medical history on file.         Past Surgical History:     Past Surgical History:   Procedure Laterality Date     AS KNEE SCOPE,MED/LAT MENISCUS REPAIR Left      inquinal hernia       TONSILLECTOMY      age 4-5            Social History:     Social History     Tobacco Use     Smoking status: Former Smoker     Smokeless tobacco: Never Used   Substance Use Topics     Alcohol use: Not on file            Family History:   No family history on file.         Allergies:     Allergies   Allergen Reactions     Peanuts [Nuts]             Medications:     Current Outpatient Medications   Medication Sig     loratadine (CLARITIN) 10 MG tablet Take 10 mg by mouth as needed     alfuzosin (UROXATRAL) 10 MG 24 hr tablet Take 1 tablet (10 mg) by mouth daily (Patient not taking: Reported on 5/18/2022)     COMPOUND CONTAINING CONTROLLED SUBSTANCE (CMPD RX) - PHARMACY TO MIX COMPOUNDED MEDICATION Insert 1 suppository per rectum as needed for pelvic floor relaxation. Max: 1 suppository per day. (Patient not taking: Reported on 5/18/2022)     fluticasone (FLONASE) 50 MCG/ACT nasal spray 2 sprays daily (Patient not taking: Reported on 5/18/2022)     hydrocortisone 2.5 % cream  (Patient not taking: Reported  on 5/18/2022)     levothyroxine (SYNTHROID/LEVOTHROID) 25 MCG tablet Take 25 mcg by mouth     mometasone (NASONEX) 50 MCG/ACT nasal spray 2 sprays (Patient not taking: Reported on 5/18/2022)     multivitamins w/minerals tablet Take 1 tablet by mouth At Bedtime (Patient not taking: Reported on 5/18/2022)     ondansetron (ZOFRAN) 4 MG tablet ondansetron HCl 4 mg tablet   TK 1 T PO Q 8 H PRN (Patient not taking: Reported on 5/18/2022)     oxyCODONE (ROXICODONE) 5 MG tablet oxycodone 5 mg tablet   TK 1 T PO Q 8 H PRN (Patient not taking: Reported on 5/18/2022)     vitamin D3 (CHOLECALCIFEROL) 2000 units tablet Take 2,000 Units by mouth At Bedtime (Patient not taking: Reported on 5/18/2022)     Current Facility-Administered Medications   Medication     dexamethasone (DECADRON) injection 1 mL     lidocaine (PF) (XYLOCAINE) 1 % injection 5 mL     triamcinolone (KENALOG-40) injection 20 mg             Review of Systems:    ROS: 14 point ROS neg other than the symptoms noted above in the HPI and PMH.          Physical Exam:   GENERAL: Healthy, alert and no distress  EYES: Eyes grossly normal to inspection.  No discharge or erythema, or obvious scleral/conjunctival abnormalities.  RESP: No audible wheeze, cough, or visible cyanosis.  No visible retractions or increased work of breathing.    SKIN: Visible skin clear. No significant rash, abnormal pigmentation or lesions.  NEURO: Cranial nerves grossly intact.  Mentation and speech appropriate for age.  PSYCH: Mentation appears normal, affect normal/bright, judgement and insight intact, normal speech and appearance well-groomed.       Labs:      PSA   Date Value Ref Range Status   12/18/2018 1.05 0 - 4 ug/L Final     Comment:     Assay Method:  Chemiluminescence using Siemens Vista analyzer         Imaging:    N/A           Video Start Time: 11:02 AM  Video-Visit Details    Type of service:  Video Visit    Video End Time:11:10 AM    Originating Location (pt. Location):  Home    Distant Location (provider location):  [facility]     Platform used for Video Visit: Cervilenz      30 minutes spent on the date of the encounter doing chart review, patient visit and documentation        Again, thank you for allowing me to participate in the care of your patient.      Sincerely,    Eun Boone PA-C

## 2022-05-18 NOTE — TELEPHONE ENCOUNTER
Spoke to pt. Informed pt that he needs to schedule an appointment for this refill request. Pt stated that he needs it for a scuba trip he's leaving for on Sunday and states that if he can't be seen prior to then, he would not need it. Pt confirmed that he'd be available today at 11 am for a possible visit.     Hyun Vicente RN MSN

## 2022-05-18 NOTE — PROGRESS NOTES
Erik is a 56 year old who is being evaluated via a billable video visit.      How would you like to obtain your AVS? MyChart  If the video visit is dropped, the invitation should be resent by: Text to cell phone: [phone]  Will anyone else be joining your video visit? No        Name: Erik Stark    MRN: [id number]   YOB: 1965                 Chief Complaint:   Medication Refill         Assessment and Plan:   56 year old male with obstructive LUTS and suspected high tone pelvic floor dysfunction, improved with PFPT, dietary modification, and PRN rectal Valium suppositories. Overall, he is doing well but requests a refill of his Valium suppositories in anticipation of an upcoming dive trip (scuba diving often prompts increased urinary hesitancy and difficulty voiding due to inability to relax the pelvic floor).  -Rectal Valium suppositories (10 mg) refilled to [facility].    Follow up as needed.      Eun Boone PA-C  May 18, 2022          History of Present Illness:   Erik Stark is a 56 year old male seen today via virtual for a medication refill. Last visit with us was 2/13/19. To briefly review, he presented to urology in 9/2018 with obstructive LUTS. Cystoscopy with Dr. Felton on 1/22/19 demonstrated minimal prostatic hypertrophy. He underwent UDS on 2/13/19 which did not reveal obvious outlet obstruction. Instead, there was concern for pelvic floor dysfunction with increased EMG activity during voiding. He was previously scheduled for TUIP with Dr. Felton but this was cancelled based on UDS findings and he was instead referred to pelvic floor physical therapy. Attended some PT here in [place] which he did not find to be particularly helpful. He was also given a prescription for rectal Valium suppositories for as needed use on scuba diving trips when he often experiences urinary hesitancy and difficulty voiding.    He then moved to [place] where he found a pelvic   who has been immensely helpful. He has been working on dietary modification and the therapist has been doing some dry needling of the pelvic floor. He has done quite well with these things over the last few years with improvement in his LUTS.     Today, he requests a refill of rectal Valium suppositories in anticipation of an upcoming dive trip scheduled for this weekend.          Past Medical History:   No past medical history on file.         Past Surgical History:     Past Surgical History:   Procedure Laterality Date     AS KNEE SCOPE,MED/LAT MENISCUS REPAIR Left      inquinal hernia       TONSILLECTOMY      age 4-5            Social History:     Social History     Tobacco Use     Smoking status: Former Smoker     Smokeless tobacco: Never Used   Substance Use Topics     Alcohol use: Not on file            Family History:   No family history on file.         Allergies:     Allergies   Allergen Reactions     Peanuts [Nuts]             Medications:     Current Outpatient Medications   Medication Sig     loratadine (CLARITIN) 10 MG tablet Take 10 mg by mouth as needed     alfuzosin (UROXATRAL) 10 MG 24 hr tablet Take 1 tablet (10 mg) by mouth daily (Patient not taking: Reported on 5/18/2022)     COMPOUND CONTAINING CONTROLLED SUBSTANCE (CMPD RX) - PHARMACY TO MIX COMPOUNDED MEDICATION Insert 1 suppository per rectum as needed for pelvic floor relaxation. Max: 1 suppository per day. (Patient not taking: Reported on 5/18/2022)     fluticasone (FLONASE) 50 MCG/ACT nasal spray 2 sprays daily (Patient not taking: Reported on 5/18/2022)     hydrocortisone 2.5 % cream  (Patient not taking: Reported on 5/18/2022)     levothyroxine (SYNTHROID/LEVOTHROID) 25 MCG tablet Take 25 mcg by mouth     mometasone (NASONEX) 50 MCG/ACT nasal spray 2 sprays (Patient not taking: Reported on 5/18/2022)     multivitamins w/minerals tablet Take 1 tablet by mouth At Bedtime (Patient not taking: Reported on 5/18/2022)      ondansetron (ZOFRAN) 4 MG tablet ondansetron HCl 4 mg tablet   TK 1 T PO Q 8 H PRN (Patient not taking: Reported on 5/18/2022)     oxyCODONE (ROXICODONE) 5 MG tablet oxycodone 5 mg tablet   TK 1 T PO Q 8 H PRN (Patient not taking: Reported on 5/18/2022)     vitamin D3 (CHOLECALCIFEROL) 2000 units tablet Take 2,000 Units by mouth At Bedtime (Patient not taking: Reported on 5/18/2022)     Current Facility-Administered Medications   Medication     dexamethasone (DECADRON) injection 1 mL     lidocaine (PF) (XYLOCAINE) 1 % injection 5 mL     triamcinolone (KENALOG-40) injection 20 mg             Review of Systems:    ROS: 14 point ROS neg other than the symptoms noted above in the HPI and PMH.          Physical Exam:   GENERAL: Healthy, alert and no distress  EYES: Eyes grossly normal to inspection.  No discharge or erythema, or obvious scleral/conjunctival abnormalities.  RESP: No audible wheeze, cough, or visible cyanosis.  No visible retractions or increased work of breathing.    SKIN: Visible skin clear. No significant rash, abnormal pigmentation or lesions.  NEURO: Cranial nerves grossly intact.  Mentation and speech appropriate for age.  PSYCH: Mentation appears normal, affect normal/bright, judgement and insight intact, normal speech and appearance well-groomed.       Labs:      PSA   Date Value Ref Range Status   12/18/2018 1.05 0 - 4 ug/L Final     Comment:     Assay Method:  Chemiluminescence using Siemens Vista analyzer         Imaging:    N/A           Video Start Time: 11:02 AM  Video-Visit Details    Type of service:  Video Visit    Video End Time:11:10 AM    Originating Location (pt. Location): Home    Distant Location (provider location):  [facility] [place]     Platform used for Video Visit: AmWell      30 minutes spent on the date of the encounter doing chart review, patient visit and documentation

## 2022-05-18 NOTE — LETTER
Date:May 19, 2022      Provider requested that no letter be sent. Do not send.       [facility]

## 2022-05-18 NOTE — NURSING NOTE
Chief Complaint   Patient presents with     RECHECK     LUTS/pelvic floor dysfunction     Vidya Esparza, [facility]

## 2022-08-02 DIAGNOSIS — G12.20 MND (MOTOR NEURONE DISEASE) (H): Primary | ICD-10-CM

## 2022-08-04 ENCOUNTER — OFFICE VISIT (OUTPATIENT)
Dept: NEUROLOGY | Facility: CLINIC | Age: 57
End: 2022-08-04
Payer: COMMERCIAL

## 2022-08-04 VITALS
HEIGHT: 68 IN | BODY MASS INDEX: 24.55 KG/M2 | DIASTOLIC BLOOD PRESSURE: 79 MMHG | OXYGEN SATURATION: 97 % | WEIGHT: 162 LBS | HEART RATE: 51 BPM | SYSTOLIC BLOOD PRESSURE: 135 MMHG

## 2022-08-04 DIAGNOSIS — Z82.0 FAMILY HX OF ALS (AMYOTROPHIC LATERAL SCLEROSIS): Primary | ICD-10-CM

## 2022-08-04 DIAGNOSIS — R25.2 MUSCLE CRAMPS: ICD-10-CM

## 2022-08-04 DIAGNOSIS — E05.90 HYPERTHYROIDISM: ICD-10-CM

## 2022-08-04 PROCEDURE — 95910 NRV CNDJ TEST 7-8 STUDIES: CPT | Performed by: PSYCHIATRY & NEUROLOGY

## 2022-08-04 PROCEDURE — 95887 MUSC TST DONE W/N TST NONEXT: CPT | Performed by: PSYCHIATRY & NEUROLOGY

## 2022-08-04 PROCEDURE — 95886 MUSC TEST DONE W/N TEST COMP: CPT | Performed by: PSYCHIATRY & NEUROLOGY

## 2022-08-04 PROCEDURE — 99203 OFFICE O/P NEW LOW 30 MIN: CPT | Mod: 25 | Performed by: PSYCHIATRY & NEUROLOGY

## 2022-08-04 NOTE — LETTER
2022       RE: Erik Stark  [address] [address]  [facility] [address]     Dear Colleague,    Thank you for referring your patient, Erik Stark, to the [facility] [place] at [facility]. Please see a copy of my visit note below.    Service Date: 2022    Madhu Talbot MD  [facility]  [address], [address]  [place]  [address]    DOMENICO Hooper  [facility] End[facility]  [address] [place]  [address]    RE:  Erik Stark  MRN:  [id number]  :  1965    Dear Doctors:    I had the pleasure to see Mr. Stark at the [facility].  He is here for cramping, fasciculations and feeling of weakness.  He has a family history of ALS.  His dad lives in [place], [place].  He is now 85, was diagnosed with ALS 5 years ago,and  had presented with head drop.  He is now in a wheelchair and uses CPAP at night.      Regarding the patient himself, he started developing cramps around the early summer of .  The cramps were occurring his hands, lower extremities and tongue.  He also experienced palpitations, sweating, heat intolerance, fatigue and tremor when doing dishes, occasionally dropping them.  He has a history of hypothyroidism, treated with L-thyroxine from  to 2020 when he stopped it.  When those symptoms emerged, however, in 2021, he had his TSH rechecked and he was hyperthyroid with very suppressed TSH at 0.02 and borderline T3 and T4.  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  He had a little difficulty swallowing before, that got better after the ablation, although he still has occasional difficulties with solids or liquids with rare choking.  He does not have any dysarthria.  He does  "get a little drooling, but it is not very bothersome.  No pseudobulbar affect.  No head drop, ptosis, or diplopia.      He has lost some muscle from last year, but he is very physically active and can still do all his ADLs with little limitations. He can still do the 60 mile ride on his bike, but it takes him much more effort.  He does not have any difficulty getting up from a low-seated chair or ascending 2 flights of stairs.  He does not trip on his foot, and despite occasional clumsiness doing the dishes, he does not have any problem with his handwriting, brushing teeth or doing fine movements.  In fact, his handwriting somewhat improved after the ablation.  He was shaky and had a tremor in his hands as well.  He denies numbness or tingling or trouble controlling his bowel or bladder.  He sleeps with 2 large pillows at night, but he does not have true orthopnea.      He has not had an EMG until today.    PAST MEDICAL HISTORY:  Unremarkable.    SOCIAL HISTORY:  He smoked until age 25 when he quit.  He drinks 4 beers a week.  No illicit drug use except for occasional marijuana.  He is an airspace .    MEDICATIONS:  None.    FAMILY HISTORY:  As above.    ALLERGIES:  Peanuts.    /79   Pulse 51   Ht 1.727 m (5' 8\")   Wt 73.5 kg (162 lb)   SpO2 97%   BMI 24.63 kg/m      PHYSICAL EXAMINATION:  On exam today, he is in good spirits.  His cranial nerve exam shows no ptosis or ophthalmoparesis and no proptosis. Ductions and versions of the eyes are normal.  There is no sustained nystagmus.  There is no weakness of orbicularis oculi, oris, uvula, jaw, palate or tongue.  No dysphonia or dysarthria.  The tongue shows equivocal atrophy and equivocal fasciculations posteriorly.  Speed of lateral tongue movements is normal.  There is no jaw jerk.  Neck flexion and extension strength are full.  Strength is 5/5 in upper and lower extremities proximally and distally.  There is absolutely no muscle weakness and no " visible focal atrophy on my exam.  I saw occasional fasciculations in the biceps but none in the lower extremities, and fasciculations were not widespread.  Tone was normal.  Agility of finger and foot tapping was completely intact.  There was no spasticity or rigidity.  Finger-to-nose was done without dysmetria.  Reflexes were 1 to 2+ in biceps, triceps, brachioradialis and symmetric, 2+ at the knees and absent at the ankles, even with reinforcement.  Toes were downgoing.  Sensory exam was intact to all modalities including vibration, light touch, pinprick and joint position.  He was able to get up from a chair with arms crossed on the chest 3 times without problems.  He was able to walk on heels and toes without problems.  Romberg was negative.    Extensive needle EMG study of left arm, left leg, thoracic paraspinals, and bulbar muscles (tongue and trapezius) done today was normal except for some large, long duration MUPs at the left vastus only which could be due to remote left L4 radiculopathy. There were no signs of motor neuron disease. Nerve conduction studies were also normal.    In summary, Mr. Stark presents with a 14-month history of cramps, occasional fasciculations, fatigue and a lot of symptoms that could be attributed to hyperthyroidism including palpitations and heat intolerance.  He improved after ablation.  His exam shows no objective muscle weakness or atrophy and no abnormal reflexes or upper motor neuron signs. He has occasional fasciculations but those could be ascribed to hyperthyroidism too. His EMG was also unremarkable. Based on the above information, he does not have ALS. He was initially interested in genetic testing given his father's history, and I told him that we are open to this, but even if he does genetic testing for familial ALS and comes back with a pathogenic variant/mutation, this means that he would be at risk for developing ALS in the future; he still does not have ALS right  now, because this diagnosis requires clinical symptoms/signs and EMG signs to prove and those are absent.     He will be seen in followup as necessary. I encouraged him to contact us with any change in his symptoms, questions, or concerns. TT spent on this encounter today 40 minutes of which 25 face to face, 10 in post-visit note dictation, editing, and orders, and 5 in pre-visit chart review.     Sincerely,    Adam Amador MD        D: 2022   T: 2022   MT: maira    Name:     SARA WARNER  MRN:      [id number]        Account:      [id number]   :      1965           Service Date: 2022       Document: [id number]

## 2022-08-04 NOTE — PROGRESS NOTES
[facility]  Electrodiagnostic Laboratory                 Department of Neurology                                                                                                         Test Date:  2022    Patient: Erik Stark : 1965 Physician: Adam Amador MD   Sex: Male AGE: 57 year Ref Phys:    ID#: [id number]   Technician: LOPEZ Denise     Clinical Information:    57 year old man with family history of ALS (father), and 14 months of muscle cramps, twitching, fatigue, subjective weakness, heat intolerance and palpitations. He was diagnosed with thyrotoxicosis and many of his symptoms improved after radioiodine ablation of the thyroid done 2022, although some symptoms (eg cramps) persist. Examination showed no solid upper or lower motor neuron signs anywhere. There were equivocal fasciculations of the tongue and rare at the biceps. EMG requested to rule out motor neuron disease.     Techniques:    Motor and sensory conduction studies were done with surface recording electrodes. EMG was done with a concentric needle electrode.     Results:    Left fibular (EDB), tibial (AH), median (APB) and ulnar (ADM) motor NCSs were normal. Left median, ulnar, and tibial F-wave latencies were normal. Left median, ulnar, and sural antidromic sensory NCSs were normal. Needle EMG was performed at selected muscles of the left upper, the left lower extremity, mid thoracic paraspinals, upper trapezius, and tongue. No fibrillations, positive waves, or high frequency discharges were appreciated in any muscle. Rare fasciculations were appreciated at the left TA, medial gastrocnemius, biceps femoris, and deltoid. MUP morphology and recruitment were normal in all muscles except for the left vastus lateralis which showed some large, long duration MUPs with normal recruitment patterns.     Interpretation:    Minimally abnormal study. There is electrodiagnostic evidence of  chronic but not active neurogenic changes at the left vastus lateralis. This finding may be due to a remote left L4 radiculopathy. There is no electrodiagnostic evidence of motor neuron disease or polyneuropathy. Rare fasciculations in a few muscles are not considered diagnostic. Clinical correlation is recommended.     ___________________________  Adam Amador MD        Nerve Conduction Studies  Motor Sites      Latency Amplitude Neg. Amp Diff Segment Distance Velocity Neg. Dur Neg Area Diff Temperature Comment   Site (ms) Norm (mV) Norm %  cm m/s Norm ms %  C    Left Fibular (EDB) Motor   Ankle 4.5  < 6.0 5.8  > 2.5  Ankle-EDB 8   5.2  31.9    Bel Fib Head 9.9 - 5.0 - -13.8 Bel Fib Head-Ankle 31 57  > 38 6.3 -0.58 31.8    Pop Fossa 12.2 - 4.2 - -16.0 Pop Fossa-Bel Fib Head 10 43  > 38 6.7 -16.9 31.8    Left Median (APB) Motor   Wrist 3.9  < 4.4 10.0  > 5.0  Wrist-APB 8   5.4  32.9    Elbow 8.0 - 9.4 - -6.0 Elbow-Wrist 23 56  > 48 5.3 -6.4 32.9    Left Tibial (AHB) Motor   Ankle 2.7  < 6.5 10.5  > 4.4  Ankle-AHB 8   6.2  31.8    Knee 10.7 - 8.2 - -21.9 Knee-Ankle 37 46  > 38 6.9 -14.7 31.8    Left Ulnar (ADM) Motor   Wrist 2.8  < 3.5 9.7  > 5.0  Wrist-ADM 8   4.6  32.9    Bel Elbow 6.1 - 9.3 - -4.1 Bel Elbow-Wrist 20 61  > 48 4.6 -3.4 32.9    Abv Elbow 7.7 - 8.6 - -7.5 Abv Elbow-Bel Elbow 10 63  > 48 4.8 -5.7 32.9      F-Wave Sites      Min F-Lat Max-Min F-Lat Mean F-Lat   Site (ms) ms ms   Left Median F-Wave   Wrist 29.7 3.9 31.7   Left Tibial F-Wave   Ankle 53.8 9.9 -   Left Ulnar F-Wave   Wrist 31.6 - 31.6     Sensory Sites      Onset Lat Peak Lat Amp (O-P) Amp (P-P) Segment Distance Velocity Temperature Comment   Site ms ms  V Norm  V  cm m/s Norm  C    Left Median Sensory   Wrist-Dig II 2.7 3.7 22  > 10 28 Wrist-Dig II 14 52  > 48 32.8    Left Sural Sensory   Calf-Lat Mall 2.7 3.6 13  > 5 13 Calf-Lat Mall 14 52  > 38 31.8    Left Ulnar Sensory   Wrist-Dig V 2.2 2.9 14  > 8 15 Wrist-Dig V 12.5 57  >  48 32.8        Electromyography     Side Muscle Ins Act Fibs/PSW Fasc HF Amp Dur Poly Recrt Int Pat   Left Tib Anterior Nml None 1+ 0 Nml Nml 0 Nml Nml   Left Gastroc MH Nml None 1+ 0 Nml Nml 0 Nml Nml   Left Vastus Lat Nml None Nml 0 2+ 2+ 0 Nml Nml   Left Biceps Fem SH Nml None 1+ 0 Nml Nml 0 Nml Nml   Left Gluteus Med Nml None Nml 0 Nml Nml 0 Nml Nml   Left Thoracic Parasp (Mid) Nml None Nml 0        Left FDI Nml None Nml 0 Nml Nml 0 Nml Nml   Left Triceps Nml None Nml 0 Nml Nml 0 Nml Nml   Left Pronator Teres Nml None Nml 0 Nml Nml 0 Nml Nml   Left Biceps Nml None Nml 0 Nml Nml 0 Nml Nml   Left Deltoid Nml None 1+ 0 Nml Nml 0 Nml Nml   Left Trapezius (Upper) Nml None Nml 0 Nml Nml 0 Nml Nml   Left Genioglossus Nml None Nml 0 Nml Nml 0 Nml Nml         NCS Waveforms:    Motor                F-Wave           Sensory             Ultrasound Images:

## 2022-08-04 NOTE — LETTER
Date:August 5, 2022      Provider requested that no letter be sent. Do not send.       [facility]

## 2022-08-04 NOTE — LETTER
2022       RE: Erik Stark  [address] [address]  [facility] [address]     Dear Colleague,    Thank you for referring your patient, Erik Stark, to the [facility] [place] at [facility]. Please see a copy of my visit note below.                        [facility]  Electrodiagnostic Laboratory                 Department of Neurology                                                                                                         Test Date:  2022    Patient: Erik Stark : 1965 Physician: Adam Amador MD   Sex: Male AGE: 57 year Ref Phys:    ID#: [id number]   Technician: LPOEZ Denise     Clinical Information:    57 year old man with family history of ALS (father), and 14 months of muscle cramps, twitching, fatigue, subjective weakness, heat intolerance and palpitations. He was diagnosed with thyrotoxicosis and many of his symptoms improved after radioiodine ablation of the thyroid done 2022, although some symptoms (eg cramps) persist. Examination showed no solid upper or lower motor neuron signs anywhere. There were equivocal fasciculations of the tongue and rare at the biceps. EMG requested to rule out motor neuron disease.     Techniques:    Motor and sensory conduction studies were done with surface recording electrodes. EMG was done with a concentric needle electrode.     Results:    Left fibular (EDB), tibial (AH), median (APB) and ulnar (ADM) motor NCSs were normal. Left median, ulnar, and tibial F-wave latencies were normal. Left median, ulnar, and sural antidromic sensory NCSs were normal. Needle EMG was performed at selected muscles of the left upper, the left lower extremity, mid thoracic paraspinals, upper trapezius, and tongue. No fibrillations, positive waves, or high frequency discharges were appreciated in any muscle. Rare fasciculations were appreciated at the left TA, medial  gastrocnemius, biceps femoris, and deltoid. MUP morphology and recruitment were normal in all muscles except for the left vastus lateralis which showed some large, long duration MUPs with normal recruitment patterns.     Interpretation:    Minimally abnormal study. There is electrodiagnostic evidence of chronic but not active neurogenic changes at the left vastus lateralis. This finding may be due to a remote left L4 radiculopathy. There is no electrodiagnostic evidence of motor neuron disease or polyneuropathy. Rare fasciculations in a few muscles are not considered diagnostic. Clinical correlation is recommended.     ___________________________  Adam Amador MD        Nerve Conduction Studies  Motor Sites      Latency Amplitude Neg. Amp Diff Segment Distance Velocity Neg. Dur Neg Area Diff Temperature Comment   Site (ms) Norm (mV) Norm %  cm m/s Norm ms %  C    Left Fibular (EDB) Motor   Ankle 4.5  < 6.0 5.8  > 2.5  Ankle-EDB 8   5.2  31.9    Bel Fib Head 9.9 - 5.0 - -13.8 Bel Fib Head-Ankle 31 57  > 38 6.3 -0.58 31.8    Pop Fossa 12.2 - 4.2 - -16.0 Pop Fossa-Bel Fib Head 10 43  > 38 6.7 -16.9 31.8    Left Median (APB) Motor   Wrist 3.9  < 4.4 10.0  > 5.0  Wrist-APB 8   5.4  32.9    Elbow 8.0 - 9.4 - -6.0 Elbow-Wrist 23 56  > 48 5.3 -6.4 32.9    Left Tibial (AHB) Motor   Ankle 2.7  < 6.5 10.5  > 4.4  Ankle-AHB 8   6.2  31.8    Knee 10.7 - 8.2 - -21.9 Knee-Ankle 37 46  > 38 6.9 -14.7 31.8    Left Ulnar (ADM) Motor   Wrist 2.8  < 3.5 9.7  > 5.0  Wrist-ADM 8   4.6  32.9    Bel Elbow 6.1 - 9.3 - -4.1 Bel Elbow-Wrist 20 61  > 48 4.6 -3.4 32.9    Abv Elbow 7.7 - 8.6 - -7.5 Abv Elbow-Bel Elbow 10 63  > 48 4.8 -5.7 32.9      F-Wave Sites      Min F-Lat Max-Min F-Lat Mean F-Lat   Site (ms) ms ms   Left Median F-Wave   Wrist 29.7 3.9 31.7   Left Tibial F-Wave   Ankle 53.8 9.9 -   Left Ulnar F-Wave   Wrist 31.6 - 31.6     Sensory Sites      Onset Lat Peak Lat Amp (O-P) Amp (P-P) Segment Distance Velocity  Temperature Comment   Site ms ms  V Norm  V  cm m/s Norm  C    Left Median Sensory   Wrist-Dig II 2.7 3.7 22  > 10 28 Wrist-Dig II 14 52  > 48 32.8    Left Sural Sensory   Calf-Lat Mall 2.7 3.6 13  > 5 13 Calf-Lat Mall 14 52  > 38 31.8    Left Ulnar Sensory   Wrist-Dig V 2.2 2.9 14  > 8 15 Wrist-Dig V 12.5 57  > 48 32.8        Electromyography     Side Muscle Ins Act Fibs/PSW Fasc HF Amp Dur Poly Recrt Int Pat   Left Tib Anterior Nml None 1+ 0 Nml Nml 0 Nml Nml   Left Gastroc MH Nml None 1+ 0 Nml Nml 0 Nml Nml   Left Vastus Lat Nml None Nml 0 2+ 2+ 0 Nml Nml   Left Biceps Fem SH Nml None 1+ 0 Nml Nml 0 Nml Nml   Left Gluteus Med Nml None Nml 0 Nml Nml 0 Nml Nml   Left Thoracic Parasp (Mid) Nml None Nml 0        Left FDI Nml None Nml 0 Nml Nml 0 Nml Nml   Left Triceps Nml None Nml 0 Nml Nml 0 Nml Nml   Left Pronator Teres Nml None Nml 0 Nml Nml 0 Nml Nml   Left Biceps Nml None Nml 0 Nml Nml 0 Nml Nml   Left Deltoid Nml None 1+ 0 Nml Nml 0 Nml Nml   Left Trapezius (Upper) Nml None Nml 0 Nml Nml 0 Nml Nml   Left Genioglossus Nml None Nml 0 Nml Nml 0 Nml Nml         NCS Waveforms:    Motor                F-Wave           Sensory             Ultrasound Images:                 Again, thank you for allowing me to participate in the care of your patient.      Sincerely,    Adam Amador MD

## 2022-08-04 NOTE — PROGRESS NOTES
Service Date: 2022    Madhu Talbot MD  [facility]  [address], [address]  [place]  [address]    DOMENICO Hooper  [facility]  [address] [place]  [address]    RE:  Erik Stark  MRN:  [id number]  :  1965    Dear Doctors:    I had the pleasure to see Mr. Stark at the [facility].  He is here for cramping, fasciculations and feeling of weakness.  He has a family history of ALS.  His dad lives in [place], [place].  He is now 85, was diagnosed with ALS 5 years ago,and  had presented with head drop.  He is now in a wheelchair and uses CPAP at night.      Regarding the patient himself, he started developing cramps around the early summer of .  The cramps were occurring his hands, lower extremities and tongue.  He also experienced palpitations, sweating, heat intolerance, fatigue and tremor when doing dishes, occasionally dropping them.  He has a history of hypothyroidism, treated with L-thyroxine from  to 2020 when he stopped it.  When those symptoms emerged, however, in 2021, he had his TSH rechecked and he was hyperthyroid with very suppressed TSH at 0.02 and borderline T3 and T4.  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  He had a little difficulty swallowing before, that got better after the ablation, although he still has occasional difficulties with solids or liquids with rare choking.  He does not have any dysarthria.  He does get a little drooling, but it is not very bothersome.  No pseudobulbar affect.  No head drop, ptosis, or diplopia.      He has lost some muscle from last year, but he is very physically active and can still do all his ADLs with little limitations. He can still do the 60 mile ride on his bike, but it takes  "him much more effort.  He does not have any difficulty getting up from a low-seated chair or ascending 2 flights of stairs.  He does not trip on his foot, and despite occasional clumsiness doing the dishes, he does not have any problem with his handwriting, brushing teeth or doing fine movements.  In fact, his handwriting somewhat improved after the ablation.  He was shaky and had a tremor in his hands as well.  He denies numbness or tingling or trouble controlling his bowel or bladder.  He sleeps with 2 large pillows at night, but he does not have true orthopnea.      He has not had an EMG until today.    PAST MEDICAL HISTORY:  Unremarkable.    SOCIAL HISTORY:  He smoked until age 25 when he quit.  He drinks 4 beers a week.  No illicit drug use except for occasional marijuana.  He is an airspace .    MEDICATIONS:  None.    FAMILY HISTORY:  As above.    ALLERGIES:  Peanuts.    /79   Pulse 51   Ht 1.727 m (5' 8\")   Wt 73.5 kg (162 lb)   SpO2 97%   BMI 24.63 kg/m      PHYSICAL EXAMINATION:  On exam today, he is in good spirits.  His cranial nerve exam shows no ptosis or ophthalmoparesis and no proptosis. Ductions and versions of the eyes are normal.  There is no sustained nystagmus.  There is no weakness of orbicularis oculi, oris, uvula, jaw, palate or tongue.  No dysphonia or dysarthria.  The tongue shows equivocal atrophy and equivocal fasciculations posteriorly.  Speed of lateral tongue movements is normal.  There is no jaw jerk.  Neck flexion and extension strength are full.  Strength is 5/5 in upper and lower extremities proximally and distally.  There is absolutely no muscle weakness and no visible focal atrophy on my exam.  I saw occasional fasciculations in the biceps but none in the lower extremities, and fasciculations were not widespread.  Tone was normal.  Agility of finger and foot tapping was completely intact.  There was no spasticity or rigidity.  Finger-to-nose was done without " dysmetria.  Reflexes were 1 to 2+ in biceps, triceps, brachioradialis and symmetric, 2+ at the knees and absent at the ankles, even with reinforcement.  Toes were downgoing.  Sensory exam was intact to all modalities including vibration, light touch, pinprick and joint position.  He was able to get up from a chair with arms crossed on the chest 3 times without problems.  He was able to walk on heels and toes without problems.  Romberg was negative.    Extensive needle EMG study of left arm, left leg, thoracic paraspinals, and bulbar muscles (tongue and trapezius) done today was normal except for some large, long duration MUPs at the left vastus only which could be due to remote left L4 radiculopathy. There were no signs of motor neuron disease. Nerve conduction studies were also normal.    In summary, Mr. Stark presents with a 14-month history of cramps, occasional fasciculations, fatigue and a lot of symptoms that could be attributed to hyperthyroidism including palpitations and heat intolerance.  He improved after ablation.  His exam shows no objective muscle weakness or atrophy and no abnormal reflexes or upper motor neuron signs. He has occasional fasciculations but those could be ascribed to hyperthyroidism too. His EMG was also unremarkable. Based on the above information, he does not have ALS. He was initially interested in genetic testing given his father's history, and I told him that we are open to this, but even if he does genetic testing for familial ALS and comes back with a pathogenic variant/mutation, this means that he would be at risk for developing ALS in the future; he still does not have ALS right now, because this diagnosis requires clinical symptoms/signs and EMG signs to prove and those are absent.     He will be seen in followup as necessary. I encouraged him to contact us with any change in his symptoms, questions, or concerns. TT spent on this encounter today 40 minutes of which 25 face to  face, 10 in post-visit note dictation, editing, and orders, and 5 in pre-visit chart review.     Sincerely,    Adam Amador MD        D: 2022   T: 2022   MT: maira    Name:     SARA WARNER  MRN:      [id number]        Account:      [id number]   :      1965           Service Date: 2022       Document: [id number]

## 2022-11-20 ENCOUNTER — HEALTH MAINTENANCE LETTER (OUTPATIENT)
Age: 57
End: 2022-11-20

## 2023-04-15 ENCOUNTER — HEALTH MAINTENANCE LETTER (OUTPATIENT)
Age: 58
End: 2023-04-15

## 2023-09-20 NOTE — LETTER
RE: Erik Stark  [address] [address]  [facility] [address]     Dear Colleague,    Thank you for referring your patient, Erik Stark, to the [facility]OLOG[facility] AND [facility] PR[facility]OLOGIC [facility]CERS at [facility]. Please see a copy of my visit note below.    Invasive Procedure Safety Checklist:    Procedure: Cystoscopy    Action: Complete sections and checkboxes as appropriate.    Pre-procedure:  1. Patient ID Verified with 2 identifiers (Gloria and  or MRN) : YES    2. Procedure and site verified with patient/designee (when able) : YES    3. Accurate consent documentation in medical record : YES    4. H&P (or appropriate assessment) documented in medical record : NO  H&P must be up to 30 days prior to procedure an updated within 24 hours of                 Procedure as applicable.     5. Relevant diagnostic and radiology test results appropriately labeled and displayed as applicable : NO    6. Blood products, implants, devices, and/or special equipment available for the procedure as applicable : NO    7. Procedure site(s) marked with provider initials [Exclusions: ] : NO    8. Marking not required. Reason : Yes  Procedure does not require site marking    Time Out:     Time-Out performed immediately prior to starting procedure, including verbal and active participation of all team members addressing: YES    1. Correct patient identity.  2. Confirmed that the correct side and site are marked.  3. An accurate procedure to be done.  4. Agreement on the procedure to be done.  5. Correct patient position.  6. Relevant images and results are properly labeled and appropriately displayed.  7. The need to administer antibiotics or fluids for irrigation purposes during the procedure as applicable.  8. Safety precautions based on patient history or medication use.    During Procedure: Verification of correct person, site, and procedure occurs any time the responsibility for care of the  patient is transferred to another member of the care team.      Vidya Esparza [facility]    CYSTOSCOPY PROCEDURE NOTE    Reason for cystoscopy: Lower urinary tract symptoms    Brief History: 54 yo M with hx of obstructive LUTS, particularly with scuba diving and in water    CYSTOSCOPY  After obtaining informed consent, the patient was prepped and draped in the standard sterile fashion.  The 15 [place] Islander flexible cystoscope was inserted through the urethral meatus.      The anterior urethra was:  normal without stricture.    The external sphincter was  appropriately coapted.   The prostatic urethra demonstrated minimal hypertrophy.    The bladder neck was  nonocclusive.    The bladder was  unremarkable for tumors, erythema or stones.    The ureteral orifices  were identified on each side in orthotopic position with efflux of clear urine.   There were no trabeculations.    On retroflexion there was the usual bladder neck hyperemia.    There was minimal circumferential intravesical protrusion of the prostate.      The patient tolerated the procedure well without complication.        Urinary Flow Rate   Peak Flow 7.9 mL/s   Average Flow 4.7 mL/s   Voided (mL) 533 mL   Residual Volume by Ultrasound 0 mL       Assessment/Plan:  Obstructive LUTS in the absence of obvious OSBORNE/BPH.  HAve requested urodynamics to confirm high pressure, low flow voiding.  If present will proceed with minimally invasive BPH intervention (discussed ALIREZA, Lorelei, Juan). Reviewed risks, benefits, alternatives.      I, Sumit Felton saw and evaluated this patient and agree with the plan as stated above.  I personally performed all listed procedures.    Again, thank you for allowing me to participate in the care of your patient.      Sincerely,    Sumit Felton MD       hand pain/injury

## 2024-06-16 ENCOUNTER — HEALTH MAINTENANCE LETTER (OUTPATIENT)
Age: 59
End: 2024-06-16

## 2025-06-21 ENCOUNTER — HEALTH MAINTENANCE LETTER (OUTPATIENT)
Age: 60
End: 2025-06-21

## (undated) RX ORDER — LIDOCAINE HYDROCHLORIDE 10 MG/ML
INJECTION, SOLUTION EPIDURAL; INFILTRATION; INTRACAUDAL; PERINEURAL
Status: DISPENSED
Start: 2019-03-22

## (undated) RX ORDER — TRIAMCINOLONE ACETONIDE 40 MG/ML
INJECTION, SUSPENSION INTRA-ARTICULAR; INTRAMUSCULAR
Status: DISPENSED
Start: 2019-03-22

## (undated) RX ORDER — CIPROFLOXACIN 500 MG/1
TABLET, FILM COATED ORAL
Status: DISPENSED
Start: 2019-02-13

## (undated) RX ORDER — DEXAMETHASONE SODIUM PHOSPHATE 4 MG/ML
INJECTION, SOLUTION INTRA-ARTICULAR; INTRALESIONAL; INTRAMUSCULAR; INTRAVENOUS; SOFT TISSUE
Status: DISPENSED
Start: 2019-03-22